# Patient Record
Sex: MALE | Race: WHITE | NOT HISPANIC OR LATINO | Employment: OTHER | URBAN - METROPOLITAN AREA
[De-identification: names, ages, dates, MRNs, and addresses within clinical notes are randomized per-mention and may not be internally consistent; named-entity substitution may affect disease eponyms.]

---

## 2017-09-25 ENCOUNTER — HOSPITAL ENCOUNTER (OUTPATIENT)
Facility: MEDICAL CENTER | Age: 77
End: 2017-09-25
Attending: INTERNAL MEDICINE
Payer: MEDICARE

## 2017-09-25 LAB
ALBUMIN SERPL BCP-MCNC: 2.8 G/DL (ref 3.2–4.9)
ALBUMIN/GLOB SERPL: 0.9 G/DL
ALP SERPL-CCNC: 133 U/L (ref 30–99)
ALT SERPL-CCNC: 15 U/L (ref 2–50)
ANION GAP SERPL CALC-SCNC: 19 MMOL/L (ref 0–11.9)
ANISOCYTOSIS BLD QL SMEAR: ABNORMAL
AST SERPL-CCNC: 14 U/L (ref 12–45)
BASOPHILS # BLD AUTO: 0 % (ref 0–1.8)
BASOPHILS # BLD: 0 K/UL (ref 0–0.12)
BILIRUB SERPL-MCNC: 0.4 MG/DL (ref 0.1–1.5)
BUN SERPL-MCNC: 102 MG/DL (ref 8–22)
CALCIUM SERPL-MCNC: 8.1 MG/DL (ref 8.5–10.5)
CHLORIDE SERPL-SCNC: 93 MMOL/L (ref 96–112)
CO2 SERPL-SCNC: 22 MMOL/L (ref 20–33)
CREAT SERPL-MCNC: 5.23 MG/DL (ref 0.5–1.4)
CRP SERPL HS-MCNC: 11.78 MG/DL (ref 0–0.75)
EOSINOPHIL # BLD AUTO: 0 K/UL (ref 0–0.51)
EOSINOPHIL NFR BLD: 0 % (ref 0–6.9)
ERYTHROCYTE [DISTWIDTH] IN BLOOD BY AUTOMATED COUNT: 87.6 FL (ref 35.9–50)
ERYTHROCYTE [SEDIMENTATION RATE] IN BLOOD BY WESTERGREN METHOD: 113 MM/HOUR (ref 0–20)
GFR SERPL CREATININE-BSD FRML MDRD: 11 ML/MIN/1.73 M 2
GLOBULIN SER CALC-MCNC: 3 G/DL (ref 1.9–3.5)
GLUCOSE SERPL-MCNC: 122 MG/DL (ref 65–99)
HCT VFR BLD AUTO: 36.2 % (ref 42–52)
HGB BLD-MCNC: 11.7 G/DL (ref 14–18)
LYMPHOCYTES # BLD AUTO: 0 K/UL (ref 1–4.8)
LYMPHOCYTES NFR BLD: 0 % (ref 22–41)
MACROCYTES BLD QL SMEAR: ABNORMAL
MANUAL DIFF BLD: NORMAL
MCH RBC QN AUTO: 34.2 PG (ref 27–33)
MCHC RBC AUTO-ENTMCNC: 32.3 G/DL (ref 33.7–35.3)
MCV RBC AUTO: 105.8 FL (ref 81.4–97.8)
METAMYELOCYTES NFR BLD MANUAL: 1.8 %
MONOCYTES # BLD AUTO: 0 K/UL (ref 0–0.85)
MONOCYTES NFR BLD AUTO: 0 % (ref 0–13.4)
MORPHOLOGY BLD-IMP: NORMAL
NEUTROPHILS # BLD AUTO: 3.04 K/UL (ref 1.82–7.42)
NEUTROPHILS NFR BLD: 95.5 % (ref 44–72)
NEUTS BAND NFR BLD MANUAL: 2.7 % (ref 0–10)
NRBC # BLD AUTO: 0 K/UL
NRBC BLD AUTO-RTO: 0 /100 WBC
PLATELET # BLD AUTO: 40 K/UL (ref 164–446)
PLATELET BLD QL SMEAR: NORMAL
PLATELETS.RETICULATED NFR BLD AUTO: 2.2 K/UL (ref 0.6–13.1)
PMV BLD AUTO: 11.1 FL (ref 9–12.9)
POIKILOCYTOSIS BLD QL SMEAR: NORMAL
POLYCHROMASIA BLD QL SMEAR: NORMAL
POTASSIUM SERPL-SCNC: 4.4 MMOL/L (ref 3.6–5.5)
PROT SERPL-MCNC: 5.8 G/DL (ref 6–8.2)
RBC # BLD AUTO: 3.42 M/UL (ref 4.7–6.1)
RBC BLD AUTO: PRESENT
SODIUM SERPL-SCNC: 134 MMOL/L (ref 135–145)
TOXIC GRANULES BLD QL SMEAR: SLIGHT
WBC # BLD AUTO: 3.1 K/UL (ref 4.8–10.8)

## 2017-09-25 PROCEDURE — 86140 C-REACTIVE PROTEIN: CPT

## 2017-09-25 PROCEDURE — 85027 COMPLETE CBC AUTOMATED: CPT

## 2017-09-25 PROCEDURE — 80299 QUANTITATIVE ASSAY DRUG: CPT

## 2017-09-25 PROCEDURE — 85007 BL SMEAR W/DIFF WBC COUNT: CPT

## 2017-09-25 PROCEDURE — 80053 COMPREHEN METABOLIC PANEL: CPT

## 2017-09-25 PROCEDURE — 85055 RETICULATED PLATELET ASSAY: CPT

## 2017-09-25 PROCEDURE — 87305 ASPERGILLUS AG IA: CPT

## 2017-09-25 PROCEDURE — 85652 RBC SED RATE AUTOMATED: CPT

## 2017-09-25 PROCEDURE — 82784 ASSAY IGA/IGD/IGG/IGM EACH: CPT

## 2017-09-27 LAB
GALACTOMANNAN AG SERPL QL IA: POSITIVE
GALACTOMANNAN AG SERPL-ACNC: 0.58

## 2017-09-28 LAB — VORICONAZOLE SERPL-MCNC: 0.3 UG/ML (ref 1–6)

## 2017-09-29 LAB
IGM SERPL-MCNC: 1160 MG/DL (ref 35–263)
VORICONAZOLE SERPL-MCNC: 1.8 UG/ML (ref 1–6)

## 2017-11-14 ENCOUNTER — HOSPITAL ENCOUNTER (INPATIENT)
Dept: HOSPITAL 8 - ED | Age: 77
LOS: 4 days | Discharge: HOME | DRG: 871 | End: 2017-11-18
Attending: FAMILY MEDICINE | Admitting: INTERNAL MEDICINE
Payer: MEDICARE

## 2017-11-14 VITALS — SYSTOLIC BLOOD PRESSURE: 144 MMHG | DIASTOLIC BLOOD PRESSURE: 74 MMHG

## 2017-11-14 VITALS — HEIGHT: 64 IN | WEIGHT: 177.03 LBS | BODY MASS INDEX: 30.22 KG/M2

## 2017-11-14 VITALS — SYSTOLIC BLOOD PRESSURE: 95 MMHG | DIASTOLIC BLOOD PRESSURE: 60 MMHG

## 2017-11-14 VITALS — DIASTOLIC BLOOD PRESSURE: 74 MMHG | SYSTOLIC BLOOD PRESSURE: 144 MMHG

## 2017-11-14 DIAGNOSIS — N25.0: ICD-10-CM

## 2017-11-14 DIAGNOSIS — E27.40: ICD-10-CM

## 2017-11-14 DIAGNOSIS — A41.9: Primary | ICD-10-CM

## 2017-11-14 DIAGNOSIS — E43: ICD-10-CM

## 2017-11-14 DIAGNOSIS — N18.6: ICD-10-CM

## 2017-11-14 DIAGNOSIS — D61.818: ICD-10-CM

## 2017-11-14 DIAGNOSIS — J96.01: ICD-10-CM

## 2017-11-14 DIAGNOSIS — Z79.01: ICD-10-CM

## 2017-11-14 DIAGNOSIS — Z82.3: ICD-10-CM

## 2017-11-14 DIAGNOSIS — I82.623: ICD-10-CM

## 2017-11-14 DIAGNOSIS — Z96.1: ICD-10-CM

## 2017-11-14 DIAGNOSIS — C85.90: ICD-10-CM

## 2017-11-14 DIAGNOSIS — J15.9: ICD-10-CM

## 2017-11-14 DIAGNOSIS — D63.1: ICD-10-CM

## 2017-11-14 DIAGNOSIS — Z87.891: ICD-10-CM

## 2017-11-14 DIAGNOSIS — Z99.2: ICD-10-CM

## 2017-11-14 DIAGNOSIS — E55.9: ICD-10-CM

## 2017-11-14 DIAGNOSIS — J43.9: ICD-10-CM

## 2017-11-14 DIAGNOSIS — D68.59: ICD-10-CM

## 2017-11-14 DIAGNOSIS — Z87.01: ICD-10-CM

## 2017-11-14 DIAGNOSIS — Z82.49: ICD-10-CM

## 2017-11-14 DIAGNOSIS — G93.40: ICD-10-CM

## 2017-11-14 DIAGNOSIS — N39.0: ICD-10-CM

## 2017-11-14 DIAGNOSIS — C88.0: ICD-10-CM

## 2017-11-14 LAB
ANISOCYTOSIS BLD QL SMEAR: (no result)
AST SERPL-CCNC: 10 U/L (ref 15–37)
BUN SERPL-MCNC: 41 MG/DL (ref 7–18)
DIFF TOTAL CELLS COUNTED: (no result)
HCT VFR BLD CALC: 28.2 % (ref 39.2–51.8)
HGB BLD-MCNC: 9.5 G/DL (ref 13.7–18)
IS PT STATUS REG ER OR PRE ER?: YES
PATH.CAST-FLAG: (no result)
SPERM-FLAG: (no result)
SRC-FLAG: (no result)
VERIFY COUNTS?: YES
WBC # BLD AUTO: 2.7 X10^3/UL (ref 3.4–10)
XTAL-FLAG: (no result)
YLC-FLAG: (no result)

## 2017-11-14 PROCEDURE — 36415 COLL VENOUS BLD VENIPUNCTURE: CPT

## 2017-11-14 PROCEDURE — 81001 URINALYSIS AUTO W/SCOPE: CPT

## 2017-11-14 PROCEDURE — 96368 THER/DIAG CONCURRENT INF: CPT

## 2017-11-14 PROCEDURE — 93005 ELECTROCARDIOGRAM TRACING: CPT

## 2017-11-14 PROCEDURE — 87070 CULTURE OTHR SPECIMN AEROBIC: CPT

## 2017-11-14 PROCEDURE — 96375 TX/PRO/DX INJ NEW DRUG ADDON: CPT

## 2017-11-14 PROCEDURE — 84100 ASSAY OF PHOSPHORUS: CPT

## 2017-11-14 PROCEDURE — 86606 ASPERGILLUS ANTIBODY: CPT

## 2017-11-14 PROCEDURE — 71010: CPT

## 2017-11-14 PROCEDURE — 87205 SMEAR GRAM STAIN: CPT

## 2017-11-14 PROCEDURE — 85025 COMPLETE CBC W/AUTO DIFF WBC: CPT

## 2017-11-14 PROCEDURE — 02HV33Z INSERTION OF INFUSION DEVICE INTO SUPERIOR VENA CAVA, PERCUTANEOUS APPROACH: ICD-10-PCS | Performed by: INTERNAL MEDICINE

## 2017-11-14 PROCEDURE — 36556 INSERT NON-TUNNEL CV CATH: CPT

## 2017-11-14 PROCEDURE — 86331 IMMUNODIFFUSION OUCHTERLONY: CPT

## 2017-11-14 PROCEDURE — 80202 ASSAY OF VANCOMYCIN: CPT

## 2017-11-14 PROCEDURE — 83605 ASSAY OF LACTIC ACID: CPT

## 2017-11-14 PROCEDURE — 84145 PROCALCITONIN (PCT): CPT

## 2017-11-14 PROCEDURE — 87305 ASPERGILLUS AG IA: CPT

## 2017-11-14 PROCEDURE — 71260 CT THORAX DX C+: CPT

## 2017-11-14 PROCEDURE — 86609 BACTERIUM ANTIBODY: CPT

## 2017-11-14 PROCEDURE — 85730 THROMBOPLASTIN TIME PARTIAL: CPT

## 2017-11-14 PROCEDURE — 87449 NOS EACH ORGANISM AG IA: CPT

## 2017-11-14 PROCEDURE — 80048 BASIC METABOLIC PNL TOTAL CA: CPT

## 2017-11-14 PROCEDURE — 86602 ANTINOMYCES ANTIBODY: CPT

## 2017-11-14 PROCEDURE — 86671 FUNGUS NES ANTIBODY: CPT

## 2017-11-14 PROCEDURE — 85610 PROTHROMBIN TIME: CPT

## 2017-11-14 PROCEDURE — 87040 BLOOD CULTURE FOR BACTERIA: CPT

## 2017-11-14 PROCEDURE — 83735 ASSAY OF MAGNESIUM: CPT

## 2017-11-14 PROCEDURE — 80053 COMPREHEN METABOLIC PANEL: CPT

## 2017-11-14 PROCEDURE — 84484 ASSAY OF TROPONIN QUANT: CPT

## 2017-11-14 PROCEDURE — 96365 THER/PROPH/DIAG IV INF INIT: CPT

## 2017-11-14 RX ADMIN — HYDROCORTISONE SODIUM SUCCINATE SCH MG: 100 INJECTION, POWDER, FOR SOLUTION INTRAMUSCULAR; INTRAVENOUS at 21:40

## 2017-11-14 RX ADMIN — ACETAMINOPHEN SCH MG: 500 TABLET, COATED ORAL at 15:53

## 2017-11-14 RX ADMIN — ACETAMINOPHEN SCH MG: 500 TABLET, COATED ORAL at 21:41

## 2017-11-14 RX ADMIN — FOLIC ACID SCH MG: 1 TABLET ORAL at 21:41

## 2017-11-14 RX ADMIN — SODIUM CHLORIDE SCH MLS/HR: 0.9 INJECTION, SOLUTION INTRAVENOUS at 15:53

## 2017-11-14 RX ADMIN — APIXABAN SCH MG: 2.5 TABLET, FILM COATED ORAL at 21:41

## 2017-11-14 RX ADMIN — ACETAMINOPHEN SCH MG: 500 TABLET, COATED ORAL at 21:00

## 2017-11-15 VITALS — SYSTOLIC BLOOD PRESSURE: 161 MMHG | DIASTOLIC BLOOD PRESSURE: 84 MMHG

## 2017-11-15 VITALS — DIASTOLIC BLOOD PRESSURE: 88 MMHG | SYSTOLIC BLOOD PRESSURE: 154 MMHG

## 2017-11-15 VITALS — SYSTOLIC BLOOD PRESSURE: 158 MMHG | DIASTOLIC BLOOD PRESSURE: 89 MMHG

## 2017-11-15 VITALS — SYSTOLIC BLOOD PRESSURE: 136 MMHG | DIASTOLIC BLOOD PRESSURE: 74 MMHG

## 2017-11-15 LAB
ANISOCYTOSIS BLD QL SMEAR: (no result)
AST SERPL-CCNC: 15 U/L (ref 15–37)
BUN SERPL-MCNC: 54 MG/DL (ref 7–18)
DIFF TOTAL CELLS COUNTED: (no result)
HCT VFR BLD CALC: 24.5 % (ref 39.2–51.8)
HGB BLD-MCNC: 8.4 G/DL (ref 13.7–18)
POLYCHROMASIA BLD QL SMEAR: (no result)
VERIFY COUNTS?: YES
WBC # BLD AUTO: 2.8 X10^3/UL (ref 3.4–10)

## 2017-11-15 PROCEDURE — 5A1D70Z PERFORMANCE OF URINARY FILTRATION, INTERMITTENT, LESS THAN 6 HOURS PER DAY: ICD-10-PCS | Performed by: INTERNAL MEDICINE

## 2017-11-15 RX ADMIN — ACETAMINOPHEN SCH MG: 500 TABLET, COATED ORAL at 11:00

## 2017-11-15 RX ADMIN — HYDROCORTISONE SODIUM SUCCINATE SCH MG: 100 INJECTION, POWDER, FOR SOLUTION INTRAMUSCULAR; INTRAVENOUS at 04:00

## 2017-11-15 RX ADMIN — FOLIC ACID SCH MG: 1 TABLET ORAL at 12:00

## 2017-11-15 RX ADMIN — OMEPRAZOLE SCH MG: 20 CAPSULE, DELAYED RELEASE ORAL at 11:59

## 2017-11-15 RX ADMIN — ACYCLOVIR SCH MG: 400 TABLET ORAL at 12:00

## 2017-11-15 RX ADMIN — HYDROCORTISONE SODIUM SUCCINATE SCH MG: 100 INJECTION, POWDER, FOR SOLUTION INTRAMUSCULAR; INTRAVENOUS at 20:14

## 2017-11-15 RX ADMIN — FOLIC ACID SCH MG: 1 TABLET ORAL at 21:00

## 2017-11-15 RX ADMIN — ACETAMINOPHEN SCH MG: 500 TABLET, COATED ORAL at 21:00

## 2017-11-15 RX ADMIN — APIXABAN SCH MG: 2.5 TABLET, FILM COATED ORAL at 20:15

## 2017-11-15 RX ADMIN — APIXABAN SCH MG: 2.5 TABLET, FILM COATED ORAL at 11:59

## 2017-11-15 RX ADMIN — ACETAMINOPHEN SCH MG: 500 TABLET, COATED ORAL at 16:00

## 2017-11-15 RX ADMIN — SODIUM CHLORIDE SCH MLS/HR: 0.9 INJECTION, SOLUTION INTRAVENOUS at 06:39

## 2017-11-15 RX ADMIN — ACETAMINOPHEN SCH MG: 500 TABLET, COATED ORAL at 05:09

## 2017-11-15 RX ADMIN — MEROPENEM SCH MLS/HR: 500 INJECTION, POWDER, FOR SOLUTION INTRAVENOUS at 01:39

## 2017-11-15 RX ADMIN — HYDROCORTISONE SODIUM SUCCINATE SCH MG: 100 INJECTION, POWDER, FOR SOLUTION INTRAMUSCULAR; INTRAVENOUS at 12:00

## 2017-11-15 RX ADMIN — Medication SCH MCG: at 11:59

## 2017-11-15 RX ADMIN — VITAMIN D, TAB 1000IU (100/BT) SCH UNITS: 25 TAB at 12:00

## 2017-11-16 VITALS — SYSTOLIC BLOOD PRESSURE: 146 MMHG | DIASTOLIC BLOOD PRESSURE: 73 MMHG

## 2017-11-16 VITALS — SYSTOLIC BLOOD PRESSURE: 172 MMHG | DIASTOLIC BLOOD PRESSURE: 81 MMHG

## 2017-11-16 VITALS — SYSTOLIC BLOOD PRESSURE: 171 MMHG | DIASTOLIC BLOOD PRESSURE: 81 MMHG

## 2017-11-16 VITALS — SYSTOLIC BLOOD PRESSURE: 107 MMHG | DIASTOLIC BLOOD PRESSURE: 67 MMHG

## 2017-11-16 LAB
AST SERPL-CCNC: 6 U/L (ref 15–37)
BUN SERPL-MCNC: 42 MG/DL (ref 7–18)
HCT VFR BLD CALC: 22.4 % (ref 39.2–51.8)
HGB BLD-MCNC: 7.7 G/DL (ref 13.7–18)
WBC # BLD AUTO: 2.5 X10^3/UL (ref 3.4–10)

## 2017-11-16 RX ADMIN — ACETAMINOPHEN SCH MG: 500 TABLET, COATED ORAL at 21:00

## 2017-11-16 RX ADMIN — FOLIC ACID SCH MG: 1 TABLET ORAL at 09:22

## 2017-11-16 RX ADMIN — SODIUM CHLORIDE SCH MLS/HR: 0.9 INJECTION, SOLUTION INTRAVENOUS at 18:41

## 2017-11-16 RX ADMIN — ACETAMINOPHEN SCH MG: 500 TABLET, COATED ORAL at 05:58

## 2017-11-16 RX ADMIN — OMEPRAZOLE SCH MG: 20 CAPSULE, DELAYED RELEASE ORAL at 09:22

## 2017-11-16 RX ADMIN — HYDROCORTISONE SODIUM SUCCINATE SCH MG: 100 INJECTION, POWDER, FOR SOLUTION INTRAMUSCULAR; INTRAVENOUS at 05:00

## 2017-11-16 RX ADMIN — SODIUM CHLORIDE SCH MLS/HR: 0.9 INJECTION, SOLUTION INTRAVENOUS at 05:00

## 2017-11-16 RX ADMIN — FOLIC ACID SCH MG: 1 TABLET ORAL at 23:03

## 2017-11-16 RX ADMIN — ACYCLOVIR SCH MG: 400 TABLET ORAL at 09:22

## 2017-11-16 RX ADMIN — ACETAMINOPHEN SCH MG: 500 TABLET, COATED ORAL at 11:59

## 2017-11-16 RX ADMIN — ACETAMINOPHEN SCH MG: 500 TABLET, COATED ORAL at 18:42

## 2017-11-16 RX ADMIN — APIXABAN SCH MG: 2.5 TABLET, FILM COATED ORAL at 23:02

## 2017-11-16 RX ADMIN — APIXABAN SCH MG: 2.5 TABLET, FILM COATED ORAL at 09:20

## 2017-11-16 RX ADMIN — MEROPENEM SCH MLS/HR: 500 INJECTION, POWDER, FOR SOLUTION INTRAVENOUS at 01:09

## 2017-11-16 RX ADMIN — Medication SCH MCG: at 09:22

## 2017-11-16 RX ADMIN — VITAMIN D, TAB 1000IU (100/BT) SCH UNITS: 25 TAB at 09:22

## 2017-11-17 VITALS — DIASTOLIC BLOOD PRESSURE: 76 MMHG | SYSTOLIC BLOOD PRESSURE: 146 MMHG

## 2017-11-17 VITALS — SYSTOLIC BLOOD PRESSURE: 124 MMHG | DIASTOLIC BLOOD PRESSURE: 69 MMHG

## 2017-11-17 VITALS — SYSTOLIC BLOOD PRESSURE: 147 MMHG | DIASTOLIC BLOOD PRESSURE: 84 MMHG

## 2017-11-17 VITALS — SYSTOLIC BLOOD PRESSURE: 152 MMHG | DIASTOLIC BLOOD PRESSURE: 69 MMHG

## 2017-11-17 VITALS — DIASTOLIC BLOOD PRESSURE: 88 MMHG | SYSTOLIC BLOOD PRESSURE: 159 MMHG

## 2017-11-17 LAB
BUN SERPL-MCNC: 59 MG/DL (ref 7–18)
HCT VFR BLD CALC: 22.9 % (ref 39.2–51.8)
HGB BLD-MCNC: 7.9 G/DL (ref 13.7–18)
WBC # BLD AUTO: 2 X10^3/UL (ref 3.4–10)

## 2017-11-17 PROCEDURE — 5A1D70Z PERFORMANCE OF URINARY FILTRATION, INTERMITTENT, LESS THAN 6 HOURS PER DAY: ICD-10-PCS | Performed by: INTERNAL MEDICINE

## 2017-11-17 RX ADMIN — ACETAMINOPHEN SCH MG: 500 TABLET, COATED ORAL at 22:10

## 2017-11-17 RX ADMIN — ACETAMINOPHEN SCH MG: 500 TABLET, COATED ORAL at 06:03

## 2017-11-17 RX ADMIN — ACYCLOVIR SCH MG: 400 TABLET ORAL at 13:37

## 2017-11-17 RX ADMIN — HYDROCORTISONE SODIUM SUCCINATE SCH MG: 100 INJECTION, POWDER, FOR SOLUTION INTRAMUSCULAR; INTRAVENOUS at 09:17

## 2017-11-17 RX ADMIN — ACETAMINOPHEN SCH MG: 500 TABLET, COATED ORAL at 15:36

## 2017-11-17 RX ADMIN — SODIUM CHLORIDE SCH MLS/HR: 0.9 INJECTION, SOLUTION INTRAVENOUS at 22:00

## 2017-11-17 RX ADMIN — ACETAMINOPHEN PRN MG: 325 TABLET, FILM COATED ORAL at 01:38

## 2017-11-17 RX ADMIN — FOLIC ACID SCH MG: 1 TABLET ORAL at 22:04

## 2017-11-17 RX ADMIN — APIXABAN SCH MG: 2.5 TABLET, FILM COATED ORAL at 22:04

## 2017-11-17 RX ADMIN — ACYCLOVIR SCH MG: 400 TABLET ORAL at 09:19

## 2017-11-17 RX ADMIN — SODIUM CHLORIDE SCH MLS/HR: 0.9 INJECTION, SOLUTION INTRAVENOUS at 06:03

## 2017-11-17 RX ADMIN — OMEPRAZOLE SCH MG: 20 CAPSULE, DELAYED RELEASE ORAL at 09:17

## 2017-11-17 RX ADMIN — Medication SCH MCG: at 09:17

## 2017-11-17 RX ADMIN — ACETAMINOPHEN SCH MG: 500 TABLET, COATED ORAL at 09:18

## 2017-11-17 RX ADMIN — VITAMIN D, TAB 1000IU (100/BT) SCH UNITS: 25 TAB at 09:17

## 2017-11-17 RX ADMIN — APIXABAN SCH MG: 2.5 TABLET, FILM COATED ORAL at 09:18

## 2017-11-17 RX ADMIN — FOLIC ACID SCH MG: 1 TABLET ORAL at 09:17

## 2017-11-18 VITALS — DIASTOLIC BLOOD PRESSURE: 80 MMHG | SYSTOLIC BLOOD PRESSURE: 174 MMHG

## 2017-11-18 VITALS — SYSTOLIC BLOOD PRESSURE: 128 MMHG | DIASTOLIC BLOOD PRESSURE: 70 MMHG

## 2017-11-18 VITALS — DIASTOLIC BLOOD PRESSURE: 75 MMHG | SYSTOLIC BLOOD PRESSURE: 128 MMHG

## 2017-11-18 RX ADMIN — ACETAMINOPHEN SCH MG: 500 TABLET, COATED ORAL at 06:00

## 2017-11-18 RX ADMIN — ACETAMINOPHEN PRN MG: 325 TABLET, FILM COATED ORAL at 07:42

## 2017-11-18 RX ADMIN — ACYCLOVIR SCH MG: 400 TABLET ORAL at 09:09

## 2017-11-18 RX ADMIN — HYDROCORTISONE SODIUM SUCCINATE SCH MG: 100 INJECTION, POWDER, FOR SOLUTION INTRAMUSCULAR; INTRAVENOUS at 09:09

## 2017-11-18 RX ADMIN — OXYCODONE HYDROCHLORIDE PRN MG: 5 TABLET ORAL at 02:56

## 2017-11-18 RX ADMIN — VITAMIN D, TAB 1000IU (100/BT) SCH UNITS: 25 TAB at 09:09

## 2017-11-18 RX ADMIN — ACETAMINOPHEN SCH MG: 500 TABLET, COATED ORAL at 10:34

## 2017-11-18 RX ADMIN — OMEPRAZOLE SCH MG: 20 CAPSULE, DELAYED RELEASE ORAL at 09:09

## 2017-11-18 RX ADMIN — OXYCODONE HYDROCHLORIDE PRN MG: 5 TABLET ORAL at 11:57

## 2017-11-18 RX ADMIN — APIXABAN SCH MG: 2.5 TABLET, FILM COATED ORAL at 09:15

## 2017-11-18 RX ADMIN — FOLIC ACID SCH MG: 1 TABLET ORAL at 09:09

## 2017-11-18 RX ADMIN — ACETAMINOPHEN SCH MG: 500 TABLET, COATED ORAL at 16:00

## 2017-11-18 RX ADMIN — SODIUM CHLORIDE SCH MLS/HR: 0.9 INJECTION, SOLUTION INTRAVENOUS at 11:00

## 2017-11-18 RX ADMIN — Medication SCH MCG: at 09:09

## 2017-11-21 LAB — FUNGITELL RESULT: 357 PG/ML (ref ?–80)

## 2017-11-29 ENCOUNTER — HOSPITAL ENCOUNTER (INPATIENT)
Dept: HOSPITAL 8 - ED | Age: 77
LOS: 1 days | Discharge: HOME | DRG: 682 | End: 2017-11-30
Attending: FAMILY MEDICINE | Admitting: FAMILY MEDICINE
Payer: MEDICARE

## 2017-11-29 VITALS — BODY MASS INDEX: 23.95 KG/M2 | HEIGHT: 71 IN | WEIGHT: 171.08 LBS

## 2017-11-29 VITALS — SYSTOLIC BLOOD PRESSURE: 146 MMHG | DIASTOLIC BLOOD PRESSURE: 64 MMHG

## 2017-11-29 VITALS — DIASTOLIC BLOOD PRESSURE: 112 MMHG | SYSTOLIC BLOOD PRESSURE: 170 MMHG

## 2017-11-29 DIAGNOSIS — G93.40: ICD-10-CM

## 2017-11-29 DIAGNOSIS — E83.52: ICD-10-CM

## 2017-11-29 DIAGNOSIS — D63.1: ICD-10-CM

## 2017-11-29 DIAGNOSIS — N25.81: ICD-10-CM

## 2017-11-29 DIAGNOSIS — Z79.01: ICD-10-CM

## 2017-11-29 DIAGNOSIS — I82.623: ICD-10-CM

## 2017-11-29 DIAGNOSIS — F41.9: ICD-10-CM

## 2017-11-29 DIAGNOSIS — J44.0: ICD-10-CM

## 2017-11-29 DIAGNOSIS — E43: ICD-10-CM

## 2017-11-29 DIAGNOSIS — J96.01: ICD-10-CM

## 2017-11-29 DIAGNOSIS — Z99.81: ICD-10-CM

## 2017-11-29 DIAGNOSIS — Z82.3: ICD-10-CM

## 2017-11-29 DIAGNOSIS — N25.0: ICD-10-CM

## 2017-11-29 DIAGNOSIS — Z87.01: ICD-10-CM

## 2017-11-29 DIAGNOSIS — Z82.49: ICD-10-CM

## 2017-11-29 DIAGNOSIS — D68.59: ICD-10-CM

## 2017-11-29 DIAGNOSIS — I12.0: Primary | ICD-10-CM

## 2017-11-29 DIAGNOSIS — E27.40: ICD-10-CM

## 2017-11-29 DIAGNOSIS — D61.818: ICD-10-CM

## 2017-11-29 DIAGNOSIS — C88.0: ICD-10-CM

## 2017-11-29 DIAGNOSIS — Z99.2: ICD-10-CM

## 2017-11-29 DIAGNOSIS — N18.6: ICD-10-CM

## 2017-11-29 DIAGNOSIS — Z87.891: ICD-10-CM

## 2017-11-29 LAB
ANISOCYTOSIS BLD QL SMEAR: (no result)
AST SERPL-CCNC: 13 U/L (ref 15–37)
BUN SERPL-MCNC: 63 MG/DL (ref 7–18)
DIFF TOTAL CELLS COUNTED: (no result)
HCT VFR BLD CALC: 23.7 % (ref 39.2–51.8)
HGB BLD-MCNC: 8.1 G/DL (ref 13.7–18)
OVALOCYTES BLD QL SMEAR: (no result)
PATH.CAST-FLAG: (no result)
POLYCHROMASIA BLD QL SMEAR: (no result)
SPERM-FLAG: (no result)
SRC-FLAG: (no result)
VERIFY COUNTS?: YES
WBC # BLD AUTO: 3.7 X10^3/UL (ref 3.4–10)
XTAL-FLAG: (no result)
YLC-FLAG: (no result)

## 2017-11-29 PROCEDURE — 87340 HEPATITIS B SURFACE AG IA: CPT

## 2017-11-29 PROCEDURE — 99285 EMERGENCY DEPT VISIT HI MDM: CPT

## 2017-11-29 PROCEDURE — 85610 PROTHROMBIN TIME: CPT

## 2017-11-29 PROCEDURE — 86704 HEP B CORE ANTIBODY TOTAL: CPT

## 2017-11-29 PROCEDURE — 5A1D70Z PERFORMANCE OF URINARY FILTRATION, INTERMITTENT, LESS THAN 6 HOURS PER DAY: ICD-10-PCS | Performed by: FAMILY MEDICINE

## 2017-11-29 PROCEDURE — 93005 ELECTROCARDIOGRAM TRACING: CPT

## 2017-11-29 PROCEDURE — 84145 PROCALCITONIN (PCT): CPT

## 2017-11-29 PROCEDURE — 85730 THROMBOPLASTIN TIME PARTIAL: CPT

## 2017-11-29 PROCEDURE — 85025 COMPLETE CBC W/AUTO DIFF WBC: CPT

## 2017-11-29 PROCEDURE — 86706 HEP B SURFACE ANTIBODY: CPT

## 2017-11-29 PROCEDURE — 83735 ASSAY OF MAGNESIUM: CPT

## 2017-11-29 PROCEDURE — 83605 ASSAY OF LACTIC ACID: CPT

## 2017-11-29 PROCEDURE — 80053 COMPREHEN METABOLIC PANEL: CPT

## 2017-11-29 PROCEDURE — 36415 COLL VENOUS BLD VENIPUNCTURE: CPT

## 2017-11-29 PROCEDURE — 71010: CPT

## 2017-11-29 PROCEDURE — 84100 ASSAY OF PHOSPHORUS: CPT

## 2017-11-29 PROCEDURE — 87040 BLOOD CULTURE FOR BACTERIA: CPT

## 2017-11-29 PROCEDURE — 82533 TOTAL CORTISOL: CPT

## 2017-11-29 PROCEDURE — 81001 URINALYSIS AUTO W/SCOPE: CPT

## 2017-11-29 RX ADMIN — APIXABAN SCH MG: 2.5 TABLET, FILM COATED ORAL at 22:32

## 2017-11-30 VITALS — DIASTOLIC BLOOD PRESSURE: 64 MMHG | SYSTOLIC BLOOD PRESSURE: 146 MMHG

## 2017-11-30 VITALS — DIASTOLIC BLOOD PRESSURE: 76 MMHG | SYSTOLIC BLOOD PRESSURE: 155 MMHG

## 2017-11-30 VITALS — DIASTOLIC BLOOD PRESSURE: 66 MMHG | SYSTOLIC BLOOD PRESSURE: 144 MMHG

## 2017-11-30 VITALS — DIASTOLIC BLOOD PRESSURE: 71 MMHG | SYSTOLIC BLOOD PRESSURE: 159 MMHG

## 2017-11-30 LAB
ANISOCYTOSIS BLD QL SMEAR: (no result)
AST SERPL-CCNC: 15 U/L (ref 15–37)
BUN SERPL-MCNC: 29 MG/DL (ref 7–18)
DIFF TOTAL CELLS COUNTED: (no result)
HCT VFR BLD CALC: 23.7 % (ref 39.2–51.8)
HEP B SURF. AB: 25.1 MIU/ML (ref 0–10)
HGB BLD-MCNC: 8.1 G/DL (ref 13.7–18)
OVALOCYTES BLD QL SMEAR: (no result)
POLYCHROMASIA BLD QL SMEAR: (no result)
VERIFY COUNTS?: YES
WBC # BLD AUTO: 2.8 X10^3/UL (ref 3.4–10)

## 2017-11-30 PROCEDURE — 5A1D70Z PERFORMANCE OF URINARY FILTRATION, INTERMITTENT, LESS THAN 6 HOURS PER DAY: ICD-10-PCS | Performed by: FAMILY MEDICINE

## 2017-11-30 RX ADMIN — APIXABAN SCH MG: 2.5 TABLET, FILM COATED ORAL at 10:50

## 2017-12-22 ENCOUNTER — HOSPITAL ENCOUNTER (OUTPATIENT)
Facility: MEDICAL CENTER | Age: 77
End: 2017-12-22
Attending: OPHTHALMOLOGY | Admitting: OPHTHALMOLOGY
Payer: MEDICARE

## 2017-12-22 VITALS
HEIGHT: 71 IN | WEIGHT: 165 LBS | BODY MASS INDEX: 23.1 KG/M2 | HEART RATE: 80 BPM | DIASTOLIC BLOOD PRESSURE: 57 MMHG | OXYGEN SATURATION: 97 % | TEMPERATURE: 98.2 F | RESPIRATION RATE: 18 BRPM | SYSTOLIC BLOOD PRESSURE: 111 MMHG

## 2017-12-22 LAB
ALBUMIN SERPL BCP-MCNC: 3.5 G/DL (ref 3.2–4.9)
ALBUMIN/GLOB SERPL: 1.5 G/DL
ALP SERPL-CCNC: 58 U/L (ref 30–99)
ALT SERPL-CCNC: 8 U/L (ref 2–50)
ANION GAP SERPL CALC-SCNC: 12 MMOL/L (ref 0–11.9)
ANISOCYTOSIS BLD QL SMEAR: ABNORMAL
APTT PPP: 21.8 SEC (ref 24.7–36)
AST SERPL-CCNC: 12 U/L (ref 12–45)
BASOPHILS # BLD AUTO: 0 % (ref 0–1.8)
BASOPHILS # BLD: 0 K/UL (ref 0–0.12)
BILIRUB SERPL-MCNC: 0.4 MG/DL (ref 0.1–1.5)
BUN SERPL-MCNC: 41 MG/DL (ref 8–22)
CALCIUM SERPL-MCNC: 9.5 MG/DL (ref 8.5–10.5)
CHLORIDE SERPL-SCNC: 97 MMOL/L (ref 96–112)
CO2 SERPL-SCNC: 29 MMOL/L (ref 20–33)
CREAT SERPL-MCNC: 5.16 MG/DL (ref 0.5–1.4)
EKG IMPRESSION: NORMAL
EOSINOPHIL # BLD AUTO: 0 K/UL (ref 0–0.51)
EOSINOPHIL NFR BLD: 0 % (ref 0–6.9)
ERYTHROCYTE [DISTWIDTH] IN BLOOD BY AUTOMATED COUNT: 86 FL (ref 35.9–50)
GFR SERPL CREATININE-BSD FRML MDRD: 11 ML/MIN/1.73 M 2
GLOBULIN SER CALC-MCNC: 2.4 G/DL (ref 1.9–3.5)
GLUCOSE SERPL-MCNC: 103 MG/DL (ref 65–99)
GRAM STN SPEC: NORMAL
HCT VFR BLD AUTO: 27.5 % (ref 42–52)
HGB BLD-MCNC: 8.8 G/DL (ref 14–18)
INR PPP: 1.3 (ref 0.87–1.13)
LYMPHOCYTES # BLD AUTO: 0.1 K/UL (ref 1–4.8)
LYMPHOCYTES NFR BLD: 2 % (ref 22–41)
MACROCYTES BLD QL SMEAR: ABNORMAL
MANUAL DIFF BLD: NORMAL
MCH RBC QN AUTO: 35.6 PG (ref 27–33)
MCHC RBC AUTO-ENTMCNC: 32 G/DL (ref 33.7–35.3)
MCV RBC AUTO: 111.3 FL (ref 81.4–97.8)
METAMYELOCYTES NFR BLD MANUAL: 2 %
MONOCYTES # BLD AUTO: 0.14 K/UL (ref 0–0.85)
MONOCYTES NFR BLD AUTO: 3 % (ref 0–13.4)
MORPHOLOGY BLD-IMP: NORMAL
MYELOCYTES NFR BLD MANUAL: 1 %
NEUTROPHILS # BLD AUTO: 4.42 K/UL (ref 1.82–7.42)
NEUTROPHILS NFR BLD: 89 % (ref 44–72)
NEUTS BAND NFR BLD MANUAL: 3 % (ref 0–10)
NRBC # BLD AUTO: 0 K/UL
NRBC BLD-RTO: 0 /100 WBC
OVALOCYTES BLD QL SMEAR: NORMAL
PLATELET # BLD AUTO: 68 K/UL (ref 164–446)
PLATELET BLD QL SMEAR: NORMAL
PMV BLD AUTO: 9.8 FL (ref 9–12.9)
POTASSIUM SERPL-SCNC: 3.9 MMOL/L (ref 3.6–5.5)
PROT SERPL-MCNC: 5.9 G/DL (ref 6–8.2)
PROTHROMBIN TIME: 15.9 SEC (ref 12–14.6)
RBC # BLD AUTO: 2.47 M/UL (ref 4.7–6.1)
RBC BLD AUTO: PRESENT
SIGNIFICANT IND 70042: NORMAL
SITE SITE: NORMAL
SODIUM SERPL-SCNC: 138 MMOL/L (ref 135–145)
SOURCE SOURCE: NORMAL
TOXIC GRANULES BLD QL SMEAR: SLIGHT
WBC # BLD AUTO: 4.8 K/UL (ref 4.8–10.8)

## 2017-12-22 PROCEDURE — 160035 HCHG PACU - 1ST 60 MINS PHASE I: Performed by: OPHTHALMOLOGY

## 2017-12-22 PROCEDURE — 700101 HCHG RX REV CODE 250

## 2017-12-22 PROCEDURE — 85027 COMPLETE CBC AUTOMATED: CPT

## 2017-12-22 PROCEDURE — 93010 ELECTROCARDIOGRAM REPORT: CPT | Performed by: INTERNAL MEDICINE

## 2017-12-22 PROCEDURE — 160009 HCHG ANES TIME/MIN: Performed by: OPHTHALMOLOGY

## 2017-12-22 PROCEDURE — 85007 BL SMEAR W/DIFF WBC COUNT: CPT

## 2017-12-22 PROCEDURE — 160036 HCHG PACU - EA ADDL 30 MINS PHASE I: Performed by: OPHTHALMOLOGY

## 2017-12-22 PROCEDURE — 80053 COMPREHEN METABOLIC PANEL: CPT

## 2017-12-22 PROCEDURE — 501836 HCHG SUTURE EYE: Performed by: OPHTHALMOLOGY

## 2017-12-22 PROCEDURE — 700111 HCHG RX REV CODE 636 W/ 250 OVERRIDE (IP)

## 2017-12-22 PROCEDURE — 160029 HCHG SURGERY MINUTES - 1ST 30 MINS LEVEL 4: Performed by: OPHTHALMOLOGY

## 2017-12-22 PROCEDURE — 700111 HCHG RX REV CODE 636 W/ 250 OVERRIDE (IP): Performed by: OPHTHALMOLOGY

## 2017-12-22 PROCEDURE — 93005 ELECTROCARDIOGRAM TRACING: CPT | Performed by: OPHTHALMOLOGY

## 2017-12-22 PROCEDURE — 160002 HCHG RECOVERY MINUTES (STAT): Performed by: OPHTHALMOLOGY

## 2017-12-22 PROCEDURE — A6402 STERILE GAUZE <= 16 SQ IN: HCPCS | Performed by: OPHTHALMOLOGY

## 2017-12-22 PROCEDURE — 87075 CULTR BACTERIA EXCEPT BLOOD: CPT

## 2017-12-22 PROCEDURE — 85730 THROMBOPLASTIN TIME PARTIAL: CPT

## 2017-12-22 PROCEDURE — 87205 SMEAR GRAM STAIN: CPT

## 2017-12-22 PROCEDURE — 160041 HCHG SURGERY MINUTES - EA ADDL 1 MIN LEVEL 4: Performed by: OPHTHALMOLOGY

## 2017-12-22 PROCEDURE — 500558 HCHG EYE SHIELD W/GARTER (FOX): Performed by: OPHTHALMOLOGY

## 2017-12-22 PROCEDURE — A6410 STERILE EYE PAD: HCPCS | Performed by: OPHTHALMOLOGY

## 2017-12-22 PROCEDURE — 85610 PROTHROMBIN TIME: CPT

## 2017-12-22 PROCEDURE — 87102 FUNGUS ISOLATION CULTURE: CPT

## 2017-12-22 PROCEDURE — 160048 HCHG OR STATISTICAL LEVEL 1-5: Performed by: OPHTHALMOLOGY

## 2017-12-22 PROCEDURE — 700101 HCHG RX REV CODE 250: Performed by: OPHTHALMOLOGY

## 2017-12-22 PROCEDURE — 87070 CULTURE OTHR SPECIMN AEROBIC: CPT

## 2017-12-22 RX ORDER — FOLIC ACID 1 MG/1
TABLET ORAL DAILY
COMMUNITY
End: 2018-01-09

## 2017-12-22 RX ORDER — GLUCOSAMINE HCL 500 MG
1000 TABLET ORAL
COMMUNITY
End: 2018-01-09

## 2017-12-22 RX ORDER — BALANCED SALT SOLUTION 6.4; .75; .48; .3; 3.9; 1.7 MG/ML; MG/ML; MG/ML; MG/ML; MG/ML; MG/ML
SOLUTION OPHTHALMIC
Status: DISCONTINUED | OUTPATIENT
Start: 2017-12-22 | End: 2017-12-22 | Stop reason: HOSPADM

## 2017-12-22 RX ORDER — BALANCED SALT SOLUTION ENRICHED WITH BICARBONATE, DEXTROSE, AND GLUTATHIONE
KIT INTRAOCULAR
Status: DISCONTINUED | OUTPATIENT
Start: 2017-12-22 | End: 2017-12-22 | Stop reason: HOSPADM

## 2017-12-22 RX ORDER — VORICONAZOLE 200 MG/1
200 TABLET, FILM COATED ORAL 2 TIMES DAILY
COMMUNITY
End: 2018-01-09

## 2017-12-22 RX ORDER — LORATADINE 10 MG/1
10 TABLET ORAL DAILY
COMMUNITY

## 2017-12-22 RX ORDER — DEXAMETHASONE SODIUM PHOSPHATE 4 MG/ML
INJECTION, SOLUTION INTRA-ARTICULAR; INTRALESIONAL; INTRAMUSCULAR; INTRAVENOUS; SOFT TISSUE
Status: DISCONTINUED | OUTPATIENT
Start: 2017-12-22 | End: 2017-12-22 | Stop reason: HOSPADM

## 2017-12-22 RX ORDER — HYDROCORTISONE 20 MG/1
5 TABLET ORAL DAILY
COMMUNITY
End: 2018-03-21

## 2017-12-22 RX ORDER — CEFAZOLIN SODIUM 1 G/3ML
INJECTION, POWDER, FOR SOLUTION INTRAMUSCULAR; INTRAVENOUS
Status: DISCONTINUED | OUTPATIENT
Start: 2017-12-22 | End: 2017-12-22 | Stop reason: HOSPADM

## 2017-12-22 RX ORDER — BALANCED SALT SOLUTION ENRICHED WITH BICARBONATE, DEXTROSE, AND GLUTATHIONE
KIT INTRAOCULAR
Status: DISCONTINUED
Start: 2017-12-22 | End: 2017-12-22 | Stop reason: HOSPADM

## 2017-12-22 RX ORDER — AMOXICILLIN 250 MG
1 CAPSULE ORAL DAILY
COMMUNITY
End: 2018-01-09

## 2017-12-22 RX ORDER — OMEPRAZOLE 20 MG/1
20 CAPSULE, DELAYED RELEASE ORAL DAILY
COMMUNITY
End: 2018-03-13 | Stop reason: SDUPTHER

## 2017-12-22 RX ORDER — PHENYLEPHRINE HYDROCHLORIDE 25 MG/ML
SOLUTION/ DROPS OPHTHALMIC
Status: COMPLETED
Start: 2017-12-22 | End: 2017-12-22

## 2017-12-22 RX ORDER — CYCLOPENTOLATE HYDROCHLORIDE 10 MG/ML
SOLUTION/ DROPS OPHTHALMIC
Status: COMPLETED
Start: 2017-12-22 | End: 2017-12-22

## 2017-12-22 RX ORDER — OXYCODONE HYDROCHLORIDE AND ACETAMINOPHEN 5; 325 MG/1; MG/1
1-2 TABLET ORAL EVERY 4 HOURS PRN
COMMUNITY
End: 2018-01-09

## 2017-12-22 RX ORDER — DOCUSATE SODIUM 100 MG/1
100 CAPSULE, LIQUID FILLED ORAL 2 TIMES DAILY
COMMUNITY
End: 2018-01-09

## 2017-12-22 RX ORDER — SEVELAMER CARBONATE FOR ORAL SUSPENSION 800 MG/1
800 POWDER, FOR SUSPENSION ORAL 3 TIMES DAILY
COMMUNITY

## 2017-12-22 RX ORDER — ACETAMINOPHEN 500 MG
500-1000 TABLET ORAL EVERY 6 HOURS PRN
COMMUNITY
End: 2018-01-09

## 2017-12-22 RX ORDER — CHOLECALCIFEROL (VITAMIN D3) 125 MCG
1000 CAPSULE ORAL DAILY
COMMUNITY
End: 2018-01-09

## 2017-12-22 RX ADMIN — VANCOMYCIN HYDROCHLORIDE 1 MG: 500 INJECTION, POWDER, LYOPHILIZED, FOR SOLUTION INTRAVENOUS at 13:00

## 2017-12-22 RX ADMIN — VORICONAZOLE: 10 INJECTION, POWDER, LYOPHILIZED, FOR SOLUTION INTRAVENOUS at 13:00

## 2017-12-22 RX ADMIN — PHENYLEPHRINE HYDROCHLORIDE 1 DROP: 2.5 SOLUTION/ DROPS OPHTHALMIC at 12:30

## 2017-12-22 RX ADMIN — CYCLOPENTOLATE HYDROCHLORIDE 1 DROP: 10 SOLUTION/ DROPS OPHTHALMIC at 12:30

## 2017-12-22 ASSESSMENT — PAIN SCALES - GENERAL
PAINLEVEL_OUTOF10: 2
PAINLEVEL_OUTOF10: 5
PAINLEVEL_OUTOF10: 2
PAINLEVEL_OUTOF10: 0
PAINLEVEL_OUTOF10: 2

## 2017-12-22 NOTE — DISCHARGE INSTRUCTIONS
RETINAL DETACHMENT OR VITRECTOMY INSTRUCTIONS    These instructions are provided so that you can have the best chance for a successful recovery from your recent eye surgery. In general, they will remain in effect for at least two to three weeks following your operation.   Please call my office to see me within one week following your discharge from the hospital. You can make this appointment by calling my office. Call Monday through Friday from 8:00 am to 5:00 pm. For patients who live a great distance from my office, I may ask you to make arrangements with your local ophthalmologist for follow up care instead of returning here.     Medications:     Administer eye drops as scheduled by physician    Eye Care:    KEEP EYE PATCH IN PLACE AT ALL TIMES EXCEPT WHEN ADMINISTERING EYE DROPS.    Appearance and sensation in the operated eye:    1.   It is normal for the operated eye to remain somewhat red, swollen and slightly irritated for four to six weeks.     2.   It is expected that there will be an increased amount of tearing and mattering in the operated eye.     Return of eyesight:     1.   You final best vision will not be known until the eye and retina are completely healed, which takes at least two to three months and sometimes much longer.     2.   Following this type of surgery, you may require a change in the prescription for your glasses or contact lenses. In general, checking for a new prescription is not done until at least two to three months following your surgery.     Physical Activities:    Things to Avoid:    1.   Aspirin  2.   Lifting or moving heavy objects (20 pounds or more)  3.   Rubbing the operated eye.   4.   Strenuous or jarring physical exertion such as running, jumping, aerobics and swimming.   5.   Driving a car.  6.   Garden or yard work.  7.   Wearing of contact lenses in the operated eye for 4-6 weeks.  8.   Returning to work or school; depending upon the nature of eye surgery and  occupation, I will advise you to refrain from returning to school or work for anywhere from 2-8 weeks.   9.   Air travel unless otherwise instructed.   10. Reading unless otherwise instructed.     Permissible Activities:    1.   Watching television and using your eyes in moderation.   2.   Cooking and light housework.   3.   Riding in a car on paved roads.  4.   Showering, bathing and shaving; however, avoid getting water in your eyes.     Indications for calling my office:     1.   Increased swelling or redness of the eyelids.  2.   Increased persistent pain in the eye which is not relieved by Tylenol or which does not go away within 24 hours.   3.   Decreased vision.

## 2017-12-22 NOTE — OR NURSING
1436 Pt arrived from OR with Dr. Pete. Pt VSS, PIV infusing without issue.  Eye patch in place to right eye, CDI.   1500 Pt denies any pain/nausea, tolerating coffee and crackers well.    1510 Pt daughter brought to bedside.    1530 Pt up to change clothes at bedside. Tolerated well. Moved to recliner.    1545 Pt feels ready to go home, PIV removed, pt tolerated well. Meets d/c criteria.   1606 Discharge instructions with patient and family. Answered all questions and concerns.   1610 PIV removed, pt tolerated well.  Pt meets d/c criteria, feels ready to go home.   1620 Pt left via wheelchair to d/c home.
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
## 2017-12-23 NOTE — OP REPORT
DATE OF SERVICE:  12/22/2017    PREOPERATIVE DIAGNOSIS:  Endophthalmitis, presumably of an infectious   etiology, right eye.    POSTOPERATIVE DIAGNOSIS:  Endophthalmitis, presumably of an infectious   etiology, right eye.    SURGEON:  Antwan Resendez MD    ASSISTANT:  None.    ANESTHESIA:  Local.    ANESTHESIOLOGIST:  Chato Pete DO    BLOOD LOSS:  None.    SPECIMENS REMOVED:  Pure vitreous, which was sent for microbiological   analysis.    PROCEDURES PERFORMED:  Pars plana vitrectomy, injection of intravitreal   antibiotics, and intravitreal antifungals.    PROCEDURE IN DETAIL:  Patient prepped and draped in the usual sterile manner.    Attention was directed towards the right eye after administration of local   anesthesia.  A 23 gauge trocars were placed 3.5 mm posterior to the limbus at   8:30, 9:30, and 2:30 o'clock.  The infusion cannula was placed in the   inferotemporal sclerotomy and secured once it was verified to be correctly   positioned.  Central vitrectomy was performed with manual aspiration, giving   us approximately 0.5 mL of pure vitreous, which was then sent for   microbiological analysis.  The infusion was then turned on and the vitrectomy   was completed.  A 2 mg of ceftazidime and 1 mg of vancomycin and 100 mcg of   voriconazole were injected through 30-gauge needles into the eye.  The trocars   were removed.  There was no leak seen.  The pressure was approximately 20 by   digital estimation.  Subconjunctival Kefzol and Decadron were injected.    Patient tolerated the procedure well and taken to recovery room in good and   stable condition.       ____________________________________     MD SERENA REYNOSO / NTS    DD:  12/22/2017 15:05:19  DT:  12/22/2017 15:20:00    D#:  [id number]  Job#:  [id number]

## 2017-12-25 LAB
BACTERIA WND AEROBE CULT: NORMAL
GRAM STN SPEC: NORMAL
SIGNIFICANT IND 70042: NORMAL
SITE SITE: NORMAL
SOURCE SOURCE: NORMAL

## 2017-12-27 LAB
BACTERIA SPEC ANAEROBE CULT: NORMAL
SIGNIFICANT IND 70042: NORMAL
SITE SITE: NORMAL
SOURCE SOURCE: NORMAL

## 2018-01-07 PROBLEM — Z99.2 END STAGE RENAL DISEASE ON DIALYSIS (HCC): Status: ACTIVE | Noted: 2018-01-07

## 2018-01-07 PROBLEM — E27.40 ADRENAL INSUFFICIENCY (HCC): Chronic | Status: ACTIVE | Noted: 2018-01-07

## 2018-01-07 PROBLEM — N18.6 END STAGE RENAL DISEASE ON DIALYSIS (HCC): Status: ACTIVE | Noted: 2018-01-07

## 2018-01-07 PROBLEM — Z87.891 HISTORY OF TOBACCO ABUSE: Status: ACTIVE | Noted: 2018-01-07

## 2018-01-07 PROBLEM — C88.0 WALDENSTROM MACROGLOBULINEMIA (HCC): Chronic | Status: ACTIVE | Noted: 2018-01-07

## 2018-01-07 PROBLEM — D53.9 MACROCYTIC ANEMIA: Chronic | Status: ACTIVE | Noted: 2018-01-07

## 2018-01-07 PROBLEM — N18.6 END STAGE RENAL DISEASE ON DIALYSIS (HCC): Chronic | Status: ACTIVE | Noted: 2018-01-07

## 2018-01-07 PROBLEM — Z86.19: Status: ACTIVE | Noted: 2018-01-07

## 2018-01-07 PROBLEM — Z79.52 STEROID DEPENDENT FOR ADRENAL SUPRESSION: Chronic | Status: ACTIVE | Noted: 2018-01-07

## 2018-01-07 PROBLEM — J44.9 COPD (CHRONIC OBSTRUCTIVE PULMONARY DISEASE) (HCC): Chronic | Status: ACTIVE | Noted: 2018-01-07

## 2018-01-07 PROBLEM — N25.0 RENAL OSTEODYSTROPHY: Chronic | Status: ACTIVE | Noted: 2018-01-07

## 2018-01-07 PROBLEM — Z99.81 OXYGEN DEPENDENT: Status: ACTIVE | Noted: 2018-01-07

## 2018-01-07 PROBLEM — J44.89 CHRONIC BRONCHITIS WITH COPD (CHRONIC OBSTRUCTIVE PULMONARY DISEASE) (HCC): Chronic | Status: ACTIVE | Noted: 2018-01-07

## 2018-01-07 PROBLEM — J18.9 RECURRENT PNEUMONIA: Status: ACTIVE | Noted: 2018-01-07

## 2018-01-07 PROBLEM — Z99.2 END STAGE RENAL DISEASE ON DIALYSIS (HCC): Chronic | Status: ACTIVE | Noted: 2018-01-07

## 2018-01-07 PROBLEM — D61.818 PANCYTOPENIA (HCC): Chronic | Status: ACTIVE | Noted: 2018-01-07

## 2018-01-07 PROBLEM — Z86.718 HISTORY OF DVT (DEEP VEIN THROMBOSIS): Status: ACTIVE | Noted: 2018-01-07

## 2018-01-09 ENCOUNTER — OFFICE VISIT (OUTPATIENT)
Dept: INTERNAL MEDICINE | Facility: IMAGING CENTER | Age: 78
End: 2018-01-09
Payer: MEDICARE

## 2018-01-09 ENCOUNTER — HOSPITAL ENCOUNTER (OUTPATIENT)
Dept: RADIOLOGY | Facility: MEDICAL CENTER | Age: 78
End: 2018-01-09
Attending: FAMILY MEDICINE
Payer: MEDICARE

## 2018-01-09 VITALS
TEMPERATURE: 97.9 F | HEIGHT: 71 IN | RESPIRATION RATE: 14 BRPM | HEART RATE: 100 BPM | OXYGEN SATURATION: 99 % | BODY MASS INDEX: 22.96 KG/M2 | DIASTOLIC BLOOD PRESSURE: 80 MMHG | WEIGHT: 164 LBS | SYSTOLIC BLOOD PRESSURE: 170 MMHG

## 2018-01-09 DIAGNOSIS — R53.83 FATIGUE, UNSPECIFIED TYPE: ICD-10-CM

## 2018-01-09 DIAGNOSIS — Z87.891 HISTORY OF TOBACCO ABUSE: ICD-10-CM

## 2018-01-09 DIAGNOSIS — D68.69 SECONDARY HYPERCOAGULABLE STATE (HCC): Chronic | ICD-10-CM

## 2018-01-09 DIAGNOSIS — Z99.2 END STAGE RENAL DISEASE ON DIALYSIS (HCC): ICD-10-CM

## 2018-01-09 DIAGNOSIS — C88.0 WALDENSTROM MACROGLOBULINEMIA (HCC): Chronic | ICD-10-CM

## 2018-01-09 DIAGNOSIS — J18.9 RECURRENT PNEUMONIA: ICD-10-CM

## 2018-01-09 DIAGNOSIS — J44.89 CHRONIC BRONCHITIS WITH COPD (CHRONIC OBSTRUCTIVE PULMONARY DISEASE): Chronic | ICD-10-CM

## 2018-01-09 DIAGNOSIS — M54.42 ACUTE BILATERAL LOW BACK PAIN WITH LEFT-SIDED SCIATICA: ICD-10-CM

## 2018-01-09 DIAGNOSIS — Z79.01 CHRONIC ANTICOAGULATION: Chronic | ICD-10-CM

## 2018-01-09 DIAGNOSIS — M54.6 THORACIC SPINE PAIN: ICD-10-CM

## 2018-01-09 DIAGNOSIS — Z99.81 OXYGEN DEPENDENT: ICD-10-CM

## 2018-01-09 DIAGNOSIS — N25.0 RENAL OSTEODYSTROPHY: Chronic | ICD-10-CM

## 2018-01-09 DIAGNOSIS — E27.40 ADRENAL INSUFFICIENCY (HCC): Chronic | ICD-10-CM

## 2018-01-09 DIAGNOSIS — D61.818 PANCYTOPENIA (HCC): Chronic | ICD-10-CM

## 2018-01-09 DIAGNOSIS — Z12.5 SCREENING FOR PROSTATE CANCER: ICD-10-CM

## 2018-01-09 DIAGNOSIS — Z00.00 HEALTH CARE MAINTENANCE: ICD-10-CM

## 2018-01-09 DIAGNOSIS — Z86.19 H/O ASPERGILLOSIS: ICD-10-CM

## 2018-01-09 DIAGNOSIS — Z86.718 HISTORY OF DVT (DEEP VEIN THROMBOSIS): ICD-10-CM

## 2018-01-09 DIAGNOSIS — D53.9 MACROCYTIC ANEMIA: Chronic | ICD-10-CM

## 2018-01-09 DIAGNOSIS — N18.6 END STAGE RENAL DISEASE ON DIALYSIS (HCC): ICD-10-CM

## 2018-01-09 DIAGNOSIS — H44.111 PANUVEITIS OF RIGHT EYE: ICD-10-CM

## 2018-01-09 DIAGNOSIS — S41.111A SKIN TEAR OF RIGHT UPPER ARM WITHOUT COMPLICATION, INITIAL ENCOUNTER: ICD-10-CM

## 2018-01-09 DIAGNOSIS — R79.89 ABNORMAL CORTISOL LEVEL: ICD-10-CM

## 2018-01-09 LAB
APPEARANCE UR: CLEAR
BILIRUB UR STRIP-MCNC: NEGATIVE MG/DL
COLOR UR AUTO: YELLOW
GLUCOSE UR STRIP.AUTO-MCNC: NEGATIVE MG/DL
KETONES UR STRIP.AUTO-MCNC: NEGATIVE MG/DL
LEUKOCYTE ESTERASE UR QL STRIP.AUTO: NEGATIVE
NITRITE UR QL STRIP.AUTO: NEGATIVE
PH UR STRIP.AUTO: 8 [PH] (ref 5–8)
PROT UR QL STRIP: 30 MG/DL
RBC UR QL AUTO: NEGATIVE
SP GR UR STRIP.AUTO: 1.01
UROBILINOGEN UR STRIP-MCNC: NEGATIVE MG/DL

## 2018-01-09 PROCEDURE — 72070 X-RAY EXAM THORAC SPINE 2VWS: CPT

## 2018-01-09 PROCEDURE — 81002 URINALYSIS NONAUTO W/O SCOPE: CPT | Performed by: FAMILY MEDICINE

## 2018-01-09 PROCEDURE — 99205 OFFICE O/P NEW HI 60 MIN: CPT | Mod: 25 | Performed by: FAMILY MEDICINE

## 2018-01-09 PROCEDURE — 72100 X-RAY EXAM L-S SPINE 2/3 VWS: CPT

## 2018-01-09 RX ORDER — DIFLUPREDNATE OPHTHALMIC 0.5 MG/ML
1 EMULSION OPHTHALMIC 4 TIMES DAILY
COMMUNITY
End: 2018-03-21

## 2018-01-09 RX ORDER — TRAMADOL HYDROCHLORIDE 50 MG/1
50 TABLET ORAL EVERY 6 HOURS PRN
Qty: 56 TAB | Refills: 0 | Status: SHIPPED | OUTPATIENT
Start: 2018-01-09 | End: 2018-01-23

## 2018-01-09 RX ORDER — LANOLIN ALCOHOL/MO/W.PET/CERES
1000 CREAM (GRAM) TOPICAL DAILY
COMMUNITY

## 2018-01-09 RX ORDER — LANOLIN ALCOHOL/MO/W.PET/CERES
800 CREAM (GRAM) TOPICAL DAILY
COMMUNITY
End: 2018-03-20

## 2018-01-09 RX ORDER — ACETAMINOPHEN 500 MG
1000 TABLET ORAL 2 TIMES DAILY
COMMUNITY

## 2018-01-09 NOTE — LETTER
January 9, 2018     Juanito Handy Blaire  [address] [place] [address]      Dear Juanito:    Thank you for enrolling in [facility]. Please follow the instructions below to securely access your online medical record. [facility] allows you to send messages to your healthcare team, view certain test results, renew your prescriptions, schedule appointments, and more.     How Do I Sign Up?  1. In your Internet browser, go to  [email or web address]  2. Click on the Sign Up Now link in the Sign In box. You will see the New Member Sign Up page.  3. Enter your [facility] Access Code exactly as it appears below (case sensitive). You will not need to use this code after you’ve completed the sign-up process. If you do not sign up before the expiration date, you must request a new code.  [facility] Access Code: [email or web address]  Expires: 2/8/2018  2:58 PM    4. Enter your Email address and Date of Birth (mm/dd/yyyy) as indicated and click Submit. You will be taken to the next sign-up page.  5. Create a [facility] ID (case sensitive) . This will be your [facility] login ID and cannot be changed, so think of one that is secure and easy to remember.  6. Create a [facility] password  (case sensitive).   · Your password must be a length of at least 6 characters/digits.  · It must include at least 1 numeric.  · You can change your password at any time.  7. Enter your Password Reset Question and Answer. This can be used at a later time if you forget your password.   8. Enter your e-mail address. You will receive e-mail notification when new information is available in [facility].  9. Click Sign Up. You can now view your medical record.     Additional Information  Please contact [facility] Customer Support at [phone] for any questions . Remember, [facility] is NOT to be used for urgent needs. For medical emergencies, dial 911.          Introducing [facility]    [facility]’s Secure, Online Health Connection      [facility] now offers  convenient, online access to your healthcare team and personal health information.  [employer] makes managing your healthcare easier than ever, and allows you to:  • Email your healthcare provider securely and privately  • Access your test results  • Request prescription refills 24 hours a day  • View your personal medical records from home  • Schedule or change your appointments  • View your Insurance and Billing Information  • Pay bills online ? Coming soon!        Sign below to get started:  I hereby request access to [employer] application.  I agree to abide by the [employer] Terms and Conditions, which will be provided to me upon activating my account.       __________________________________        _________________________  Name (Please Print)          Date of Birth     __________________________________       _________________________  Signature          Primary Care Provider      _______________  Date                          *For Internal Use Only: Please scan this form into the patient’s chart. Click on  - Select Patient - Attach to Encounter:  - Document Type: Consent   - Document Description: MyChart Consent

## 2018-01-09 NOTE — LETTER
January 9, 2018     Juanito Handy Bl[place]  [address] [place] [address]      Dear Juanito:    Thank you for enrolling in [employer]. Please follow the instructions below to securely access your online medical record. [facility] allows you to send messages to your healthcare team, view certain test results, renew your prescriptions, schedule appointments, and more.     How Do I Sign Up?  1. In your Internet browser, go to  [email or web address]  2. Click on the Sign Up Now link in the Sign In box. You will see the New Member Sign Up page.  3. Enter your [facility] Access Code exactly as it appears below (case sensitive). You will not need to use this code after you’ve completed the sign-up process. If you do not sign up before the expiration date, you must request a new code.  [facility] Access Code: Activation code not generated  Current [facility] Status: Patient Declined    4. Enter your Email address and Date of Birth (mm/dd/yyyy) as indicated and click Submit. You will be taken to the next sign-up page.  5. Create a [facility] ID (case sensitive) . This will be your [facility] login ID and cannot be changed, so think of one that is secure and easy to remember.  6. Create a [facility] password  (case sensitive).   · Your password must be a length of at least 6 characters/digits.  · It must include at least 1 numeric.  · You can change your password at any time.  7. Enter your Password Reset Question and Answer. This can be used at a later time if you forget your password.   8. Enter your e-mail address. You will receive e-mail notification when new information is available in [facility].  9. Click Sign Up. You can now view your medical record.     Additional Information  Please contact [employer] Customer Support at [phone] for any questions . Remember, [employer] is NOT to be used for urgent needs. For medical emergencies, dial 911.          Introducing [employer]    [facility]’s Secure, Online Health Connection      [facility] now offers convenient, online access to your healthcare team and personal health information.  [employer] makes managing your healthcare easier than ever, and allows you to:  • Email your healthcare provider securely and privately  • Access your test results  • Request prescription refills 24 hours a day  • View your personal medical records from home  • Schedule or change your appointments  • View your Insurance and Billing Information  • Pay bills online ? Coming soon!        Sign below to get started:  I hereby request access to [email or web address] application.  I agree to abide by the [employer] Terms and Conditions, which will be provided to me upon activating my account.       __________________________________        _________________________  Name (Please Print)          Date of Birth     __________________________________       _________________________  Signature          Primary Care Provider      _______________  Date                          *For Internal Use Only: Please scan this form into the patient’s chart. Click on  - Select Patient - Attach to Encounter:  - Document Type: Consent   - Document Description: MyChart Consent

## 2018-01-10 ENCOUNTER — TELEPHONE (OUTPATIENT)
Dept: INTERNAL MEDICINE | Facility: IMAGING CENTER | Age: 78
End: 2018-01-10

## 2018-01-10 PROBLEM — H44.111 PANUVEITIS OF RIGHT EYE: Status: ACTIVE | Noted: 2018-01-10
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
## 2018-01-15 LAB
25(OH)D3+25(OH)D2 SERPL-MCNC: 51.6 NG/ML (ref 30–100)
ALBUMIN SERPL-MCNC: 3.7 G/DL (ref 3.5–4.8)
ALBUMIN/GLOB SERPL: 1.9 {RATIO} (ref 1.2–2.2)
ALP SERPL-CCNC: 99 IU/L (ref 39–117)
ALT SERPL-CCNC: 8 IU/L (ref 0–44)
AST SERPL-CCNC: 14 IU/L (ref 0–40)
BASOPHILS # BLD AUTO: 0 X10E3/UL (ref 0–0.2)
BASOPHILS NFR BLD AUTO: 0 %
BILIRUB SERPL-MCNC: 0.2 MG/DL (ref 0–1.2)
BUN SERPL-MCNC: 50 MG/DL (ref 8–27)
BUN/CREAT SERPL: 6 (ref 10–24)
CALCIUM SERPL-MCNC: 10 MG/DL (ref 8.6–10.2)
CHLORIDE SERPL-SCNC: 94 MMOL/L (ref 96–106)
CHOLEST SERPL-MCNC: 122 MG/DL (ref 100–199)
CO2 SERPL-SCNC: 27 MMOL/L (ref 18–29)
COMMENT 011824: NORMAL
CORTIS SERPL-MCNC: 14.1 UG/DL
CREAT SERPL-MCNC: 7.88 MG/DL (ref 0.76–1.27)
EOSINOPHIL # BLD AUTO: 0 X10E3/UL (ref 0–0.4)
EOSINOPHIL NFR BLD AUTO: 1 %
ERYTHROCYTE [DISTWIDTH] IN BLOOD BY AUTOMATED COUNT: 14.9 % (ref 12.3–15.4)
GLOBULIN SER CALC-MCNC: 2 G/DL (ref 1.5–4.5)
GLUCOSE SERPL-MCNC: 117 MG/DL (ref 65–99)
HCT VFR BLD AUTO: 29.5 % (ref 37.5–51)
HDLC SERPL-MCNC: 48 MG/DL
HGB BLD-MCNC: 9.8 G/DL (ref 13–17.7)
IF AFRICAN AMERICAN  100797: 7 ML/MIN/1.73
IF NON AFRICAN AMER 100791: 6 ML/MIN/1.73
IMM GRANULOCYTES # BLD: ABNORMAL 10*3/UL
IMM GRANULOCYTES NFR BLD: ABNORMAL %
IMMATURE CELLS  115398: ABNORMAL
LDLC SERPL CALC-MCNC: 57 MG/DL (ref 0–99)
LYMPHOCYTES # BLD AUTO: 0.3 X10E3/UL (ref 0.7–3.1)
LYMPHOCYTES NFR BLD AUTO: 12 %
MCH RBC QN AUTO: 34.4 PG (ref 26.6–33)
MCHC RBC AUTO-ENTMCNC: 33.2 G/DL (ref 31.5–35.7)
MCV RBC AUTO: 104 FL (ref 79–97)
MONOCYTES # BLD AUTO: 0.2 X10E3/UL (ref 0.1–0.9)
MONOCYTES NFR BLD AUTO: 10 %
MORPHOLOGY BLD-IMP: ABNORMAL
NEUTROPHILS # BLD AUTO: 1.7 X10E3/UL (ref 1.4–7)
NEUTROPHILS NFR BLD AUTO: 77 %
NRBC BLD AUTO-RTO: ABNORMAL %
PLATELET # BLD AUTO: 78 X10E3/UL (ref 150–379)
POTASSIUM SERPL-SCNC: 4.6 MMOL/L (ref 3.5–5.2)
PROT SERPL-MCNC: 5.7 G/DL (ref 6–8.5)
PSA SERPL-MCNC: 0.4 NG/ML (ref 0–4)
RBC # BLD AUTO: 2.85 X10E6/UL (ref 4.14–5.8)
SODIUM SERPL-SCNC: 136 MMOL/L (ref 134–144)
TRIGL SERPL-MCNC: 85 MG/DL (ref 0–149)
TSH SERPL DL<=0.005 MIU/L-ACNC: 1.24 UIU/ML (ref 0.45–4.5)
VLDLC SERPL CALC-MCNC: 17 MG/DL (ref 5–40)
WBC # BLD AUTO: 2.2 X10E3/UL (ref 3.4–10.8)

## 2018-01-16 ENCOUNTER — TELEPHONE (OUTPATIENT)
Dept: INTERNAL MEDICINE | Facility: IMAGING CENTER | Age: 78
End: 2018-01-16

## 2018-01-17 LAB
FUNGUS SPEC CULT: NORMAL
SIGNIFICANT IND 70042: NORMAL
SITE SITE: NORMAL
SOURCE SOURCE: NORMAL

## 2018-01-22 ENCOUNTER — HOSPITAL ENCOUNTER (INPATIENT)
Dept: HOSPITAL 8 - ED | Age: 78
LOS: 3 days | Discharge: HOME HEALTH | DRG: 91 | End: 2018-01-25
Attending: FAMILY MEDICINE | Admitting: FAMILY MEDICINE
Payer: MEDICARE

## 2018-01-22 VITALS — BODY MASS INDEX: 21.98 KG/M2 | HEIGHT: 71 IN | WEIGHT: 156.97 LBS

## 2018-01-22 DIAGNOSIS — Z99.2: ICD-10-CM

## 2018-01-22 DIAGNOSIS — M54.50 LUMBAR PAIN WITH RADIATION DOWN LEG: ICD-10-CM

## 2018-01-22 DIAGNOSIS — Z79.01: ICD-10-CM

## 2018-01-22 DIAGNOSIS — E27.40: ICD-10-CM

## 2018-01-22 DIAGNOSIS — Z87.891: ICD-10-CM

## 2018-01-22 DIAGNOSIS — E43: ICD-10-CM

## 2018-01-22 DIAGNOSIS — M79.606 LUMBAR PAIN WITH RADIATION DOWN LEG: ICD-10-CM

## 2018-01-22 DIAGNOSIS — G89.29: ICD-10-CM

## 2018-01-22 DIAGNOSIS — M51.36 DDD (DEGENERATIVE DISC DISEASE), LUMBAR: ICD-10-CM

## 2018-01-22 DIAGNOSIS — M48.56XA: ICD-10-CM

## 2018-01-22 DIAGNOSIS — Z82.49: ICD-10-CM

## 2018-01-22 DIAGNOSIS — N25.0: ICD-10-CM

## 2018-01-22 DIAGNOSIS — D68.69: ICD-10-CM

## 2018-01-22 DIAGNOSIS — M62.838 MUSCLE SPASM: ICD-10-CM

## 2018-01-22 DIAGNOSIS — M54.10: ICD-10-CM

## 2018-01-22 DIAGNOSIS — Z86.718: ICD-10-CM

## 2018-01-22 DIAGNOSIS — D61.818: ICD-10-CM

## 2018-01-22 DIAGNOSIS — I12.0: ICD-10-CM

## 2018-01-22 DIAGNOSIS — Z99.81: ICD-10-CM

## 2018-01-22 DIAGNOSIS — J44.9: ICD-10-CM
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
## 2018-01-22 DIAGNOSIS — N18.6: ICD-10-CM

## 2018-01-22 DIAGNOSIS — C88.0: ICD-10-CM

## 2018-01-22 DIAGNOSIS — Z85.72: ICD-10-CM

## 2018-01-22 DIAGNOSIS — T40.4X5A: ICD-10-CM

## 2018-01-22 DIAGNOSIS — J98.11: ICD-10-CM

## 2018-01-22 DIAGNOSIS — J96.10: ICD-10-CM

## 2018-01-22 DIAGNOSIS — G92: Primary | ICD-10-CM

## 2018-01-22 DIAGNOSIS — Z87.01: ICD-10-CM

## 2018-01-22 PROCEDURE — 83970 ASSAY OF PARATHORMONE: CPT

## 2018-01-22 PROCEDURE — 71045 X-RAY EXAM CHEST 1 VIEW: CPT

## 2018-01-22 PROCEDURE — 83540 ASSAY OF IRON: CPT

## 2018-01-22 PROCEDURE — 70450 CT HEAD/BRAIN W/O DYE: CPT

## 2018-01-22 PROCEDURE — 87040 BLOOD CULTURE FOR BACTERIA: CPT

## 2018-01-22 PROCEDURE — 84550 ASSAY OF BLOOD/URIC ACID: CPT

## 2018-01-22 PROCEDURE — 36415 COLL VENOUS BLD VENIPUNCTURE: CPT

## 2018-01-22 PROCEDURE — 93005 ELECTROCARDIOGRAM TRACING: CPT

## 2018-01-22 PROCEDURE — 70551 MRI BRAIN STEM W/O DYE: CPT

## 2018-01-22 PROCEDURE — 83550 IRON BINDING TEST: CPT

## 2018-01-22 PROCEDURE — 82728 ASSAY OF FERRITIN: CPT

## 2018-01-22 PROCEDURE — 85025 COMPLETE CBC W/AUTO DIFF WBC: CPT

## 2018-01-22 PROCEDURE — 72148 MRI LUMBAR SPINE W/O DYE: CPT

## 2018-01-22 PROCEDURE — 80053 COMPREHEN METABOLIC PANEL: CPT

## 2018-01-22 PROCEDURE — 84439 ASSAY OF FREE THYROXINE: CPT

## 2018-01-22 PROCEDURE — 85610 PROTHROMBIN TIME: CPT

## 2018-01-22 PROCEDURE — 82306 VITAMIN D 25 HYDROXY: CPT

## 2018-01-22 PROCEDURE — 84100 ASSAY OF PHOSPHORUS: CPT

## 2018-01-22 PROCEDURE — 80048 BASIC METABOLIC PNL TOTAL CA: CPT

## 2018-01-22 PROCEDURE — 83735 ASSAY OF MAGNESIUM: CPT

## 2018-01-22 PROCEDURE — 83605 ASSAY OF LACTIC ACID: CPT

## 2018-01-22 PROCEDURE — 99285 EMERGENCY DEPT VISIT HI MDM: CPT

## 2018-01-22 PROCEDURE — 84443 ASSAY THYROID STIM HORMONE: CPT

## 2018-01-22 PROCEDURE — 85730 THROMBOPLASTIN TIME PARTIAL: CPT

## 2018-01-22 RX ORDER — GABAPENTIN 100 MG/1
100 CAPSULE ORAL 3 TIMES DAILY
Qty: 90 CAP | Refills: 2 | Status: SHIPPED | OUTPATIENT
Start: 2018-01-22 | End: 2018-03-20

## 2018-01-22 NOTE — PROGRESS NOTES
Spoke with patient's daughter at length this morning on behalf of pt - she reports that the Ultram is helping with his back pain but he continues to have radicular symptoms down his legs at times. We discussed starting low dose neurontin to help with radicular symptom control, and she agrees that her father would be open to that. He is also ready to go forward with MRI of L spine and she has confirmed that his permacath is MRI compatible per dialysis team today. She is having issues with [employer] in obtaining a portable oxygen concentrator via Dr. Mcknight's office, and I will have Amina STAFFORD call [facility] to see what the delay is/ how we can help facilitate this process. Our office will contact her when we have more information about items above.

## 2018-01-23 ENCOUNTER — TELEPHONE (OUTPATIENT)
Dept: INTERNAL MEDICINE | Facility: IMAGING CENTER | Age: 78
End: 2018-01-23

## 2018-01-23 VITALS — DIASTOLIC BLOOD PRESSURE: 100 MMHG | SYSTOLIC BLOOD PRESSURE: 199 MMHG

## 2018-01-23 VITALS — DIASTOLIC BLOOD PRESSURE: 73 MMHG | SYSTOLIC BLOOD PRESSURE: 179 MMHG

## 2018-01-23 VITALS — DIASTOLIC BLOOD PRESSURE: 82 MMHG | SYSTOLIC BLOOD PRESSURE: 179 MMHG

## 2018-01-23 VITALS — SYSTOLIC BLOOD PRESSURE: 189 MMHG | DIASTOLIC BLOOD PRESSURE: 79 MMHG

## 2018-01-23 VITALS — SYSTOLIC BLOOD PRESSURE: 164 MMHG | DIASTOLIC BLOOD PRESSURE: 81 MMHG

## 2018-01-23 LAB
ALBUMIN SERPL-MCNC: 3.1 G/DL (ref 3.4–5)
ALP SERPL-CCNC: 99 U/L (ref 45–117)
ALT SERPL-CCNC: 11 U/L (ref 12–78)
ANION GAP SERPL CALC-SCNC: 12 MMOL/L (ref 5–15)
APTT BLD: 22 SECONDS (ref 25–31)
BASOPHILS # BLD AUTO: 0 X10^3/UL (ref 0–0.1)
BASOPHILS NFR BLD AUTO: 0 % (ref 0–1)
BILIRUB SERPL-MCNC: 0.5 MG/DL (ref 0.2–1)
CALCIUM SERPL-MCNC: 9 MG/DL (ref 8.5–10.1)
CHLORIDE SERPL-SCNC: 100 MMOL/L (ref 98–107)
CREAT SERPL-MCNC: 5.63 MG/DL (ref 0.7–1.3)
EOSINOPHIL # BLD AUTO: 0.01 X10^3/UL (ref 0–0.4)
EOSINOPHIL NFR BLD AUTO: 0 % (ref 1–7)
ERYTHROCYTE [DISTWIDTH] IN BLOOD BY AUTOMATED COUNT: 15.8 % (ref 9.4–14.8)
INR PPP: 1.07 (ref 0.93–1.1)
LYMPHOCYTES # BLD AUTO: 0.25 X10^3/UL (ref 1–3.4)
LYMPHOCYTES NFR BLD AUTO: 9 % (ref 22–44)
MCH RBC QN AUTO: 34.9 PG (ref 27.5–34.5)
MCHC RBC AUTO-ENTMCNC: 32.9 G/DL (ref 33.2–36.2)
MCV RBC AUTO: 106 FL (ref 81–97)
MD: NO
MONOCYTES # BLD AUTO: 0.19 X10^3/UL (ref 0.2–0.8)
MONOCYTES NFR BLD AUTO: 7 % (ref 2–9)
NEUTROPHILS # BLD AUTO: 2.41 X10^3/UL (ref 1.8–6.8)
NEUTROPHILS NFR BLD AUTO: 84 % (ref 42–75)
PLATELET # BLD AUTO: 117 X10^3/UL (ref 130–400)
PMV BLD AUTO: 6.7 FL (ref 7.4–10.4)
PROT SERPL-MCNC: 6.5 G/DL (ref 6.4–8.2)
PROTHROMBIN TIME: 11.1 SECONDS (ref 9.6–11.5)
RBC # BLD AUTO: 3.31 X10^6/UL (ref 4.38–5.82)

## 2018-01-23 RX ADMIN — DOCUSATE SODIUM 50MG AND SENNOSIDES 8.6MG SCH TAB: 8.6; 5 TABLET, FILM COATED ORAL at 08:27

## 2018-01-23 RX ADMIN — Medication SCH MG: at 08:27

## 2018-01-23 RX ADMIN — ACETAMINOPHEN PRN MG: 325 TABLET, FILM COATED ORAL at 03:52

## 2018-01-23 RX ADMIN — ACETAMINOPHEN PRN MG: 325 TABLET, FILM COATED ORAL at 23:48

## 2018-01-23 RX ADMIN — BUDESONIDE AND FORMOTEROL FUMARATE DIHYDRATE SCH DROP: 160; 4.5 AEROSOL RESPIRATORY (INHALATION) at 15:43

## 2018-01-23 RX ADMIN — HYDRALAZINE HYDROCHLORIDE PRN MG: 20 INJECTION INTRAMUSCULAR; INTRAVENOUS at 03:43

## 2018-01-23 RX ADMIN — SENNOSIDES SCH MG: 8.6 TABLET, FILM COATED ORAL at 08:28

## 2018-01-23 RX ADMIN — BUDESONIDE AND FORMOTEROL FUMARATE DIHYDRATE SCH DROP: 160; 4.5 AEROSOL RESPIRATORY (INHALATION) at 05:28

## 2018-01-23 RX ADMIN — BUDESONIDE AND FORMOTEROL FUMARATE DIHYDRATE SCH DROP: 160; 4.5 AEROSOL RESPIRATORY (INHALATION) at 21:00

## 2018-01-23 RX ADMIN — LABETALOL HYDROCHLORIDE PRN MG: 5 INJECTION INTRAVENOUS at 23:16

## 2018-01-23 RX ADMIN — FOLIC ACID SCH MG: 1 TABLET ORAL at 09:00

## 2018-01-23 RX ADMIN — LABETALOL HYDROCHLORIDE PRN MG: 5 INJECTION INTRAVENOUS at 08:29

## 2018-01-23 RX ADMIN — APIXABAN SCH MG: 2.5 TABLET, FILM COATED ORAL at 08:28

## 2018-01-23 RX ADMIN — BUDESONIDE AND FORMOTEROL FUMARATE DIHYDRATE SCH DROP: 160; 4.5 AEROSOL RESPIRATORY (INHALATION) at 11:00

## 2018-01-23 RX ADMIN — Medication SCH MG: at 09:00

## 2018-01-23 RX ADMIN — APIXABAN SCH MG: 2.5 TABLET, FILM COATED ORAL at 21:32

## 2018-01-23 RX ADMIN — FOLIC ACID SCH MG: 1 TABLET ORAL at 08:40

## 2018-01-23 RX ADMIN — ACETAMINOPHEN PRN MG: 325 TABLET, FILM COATED ORAL at 17:25

## 2018-01-24 ENCOUNTER — TELEPHONE (OUTPATIENT)
Dept: INTERNAL MEDICINE | Facility: IMAGING CENTER | Age: 78
End: 2018-01-24

## 2018-01-24 VITALS — DIASTOLIC BLOOD PRESSURE: 73 MMHG | SYSTOLIC BLOOD PRESSURE: 149 MMHG

## 2018-01-24 VITALS — DIASTOLIC BLOOD PRESSURE: 83 MMHG | SYSTOLIC BLOOD PRESSURE: 177 MMHG

## 2018-01-24 VITALS — SYSTOLIC BLOOD PRESSURE: 151 MMHG | DIASTOLIC BLOOD PRESSURE: 86 MMHG

## 2018-01-24 VITALS — DIASTOLIC BLOOD PRESSURE: 89 MMHG | SYSTOLIC BLOOD PRESSURE: 187 MMHG

## 2018-01-24 VITALS — SYSTOLIC BLOOD PRESSURE: 125 MMHG | DIASTOLIC BLOOD PRESSURE: 70 MMHG

## 2018-01-24 LAB
ANION GAP SERPL CALC-SCNC: 12 MMOL/L (ref 5–15)
CALCIUM SERPL-MCNC: 10.1 MG/DL (ref 8.5–10.1)
CHLORIDE SERPL-SCNC: 99 MMOL/L (ref 98–107)
CREAT SERPL-MCNC: 8.8 MG/DL (ref 0.7–1.3)
T4 FREE SERPL-MCNC: 1.41 NG/DL (ref 0.76–1.46)
TSH SERPL-ACNC: 1.48 MIU/L (ref 0.36–3.74)

## 2018-01-24 PROCEDURE — 5A1D70Z PERFORMANCE OF URINARY FILTRATION, INTERMITTENT, LESS THAN 6 HOURS PER DAY: ICD-10-PCS | Performed by: INTERNAL MEDICINE

## 2018-01-24 RX ADMIN — DOCUSATE SODIUM 50MG AND SENNOSIDES 8.6MG SCH TAB: 8.6; 5 TABLET, FILM COATED ORAL at 09:00

## 2018-01-24 RX ADMIN — APIXABAN SCH MG: 5 TABLET, FILM COATED ORAL at 21:32

## 2018-01-24 RX ADMIN — GABAPENTIN SCH MG: 100 CAPSULE ORAL at 21:32

## 2018-01-24 RX ADMIN — HYDROCORTISONE SCH MG: 10 TABLET ORAL at 10:05

## 2018-01-24 RX ADMIN — GABAPENTIN SCH MG: 100 CAPSULE ORAL at 11:54

## 2018-01-24 RX ADMIN — ACETAMINOPHEN SCH MG: 500 TABLET, COATED ORAL at 21:33

## 2018-01-24 RX ADMIN — HYDRALAZINE HYDROCHLORIDE PRN MG: 20 INJECTION INTRAMUSCULAR; INTRAVENOUS at 01:53

## 2018-01-24 RX ADMIN — Medication SCH MG: at 11:25

## 2018-01-24 RX ADMIN — APIXABAN SCH MG: 2.5 TABLET, FILM COATED ORAL at 10:05

## 2018-01-24 RX ADMIN — ACETAMINOPHEN SCH MG: 500 TABLET, COATED ORAL at 11:54

## 2018-01-24 RX ADMIN — FOLIC ACID SCH MG: 1 TABLET ORAL at 11:24

## 2018-01-24 RX ADMIN — SENNOSIDES SCH MG: 8.6 TABLET, FILM COATED ORAL at 09:00

## 2018-01-24 RX ADMIN — BUDESONIDE AND FORMOTEROL FUMARATE DIHYDRATE SCH DROP: 160; 4.5 AEROSOL RESPIRATORY (INHALATION) at 21:00

## 2018-01-24 RX ADMIN — GABAPENTIN SCH MG: 100 CAPSULE ORAL at 16:00

## 2018-01-24 RX ADMIN — BUDESONIDE AND FORMOTEROL FUMARATE DIHYDRATE SCH DROP: 160; 4.5 AEROSOL RESPIRATORY (INHALATION) at 05:49

## 2018-01-24 RX ADMIN — BUDESONIDE AND FORMOTEROL FUMARATE DIHYDRATE SCH DROP: 160; 4.5 AEROSOL RESPIRATORY (INHALATION) at 11:00

## 2018-01-24 RX ADMIN — BUDESONIDE AND FORMOTEROL FUMARATE DIHYDRATE SCH DROP: 160; 4.5 AEROSOL RESPIRATORY (INHALATION) at 16:00

## 2018-01-24 NOTE — TELEPHONE ENCOUNTER
Spoke with patient's daughter Radha this morning regarding his status. He remains hospitalized [facility] and continues to improve from a mentation standpoint. Radha was able to speak to Dr. Rodriguez his hospitalist yesterday, and he would like a copy of his recent outpatient labs. Radha is very encouraged by his progress and also reported that they will be able to obtain MRI imaging as inpatient. I will cancel MRI scheduled in our system for him and will fax lab results to [phone] - I called and spoke with his RN Sandra to alert her of incoming fax and she will place records in his chart for Dr. Rodriguez to review today.

## 2018-01-24 NOTE — TELEPHONE ENCOUNTER
Spoke with Radha, pt's daughter, who detailed that he became altered at home last night and was slurring words. He was transported to [facility] and is now hospitalized. He is feeling better today and is able to recognize her and is less altered. Work up is in progress but no definitive diagnosis was known when I spoke with her earlier today. She did report that her father did not change medications nor start low dose neurontin prior to this change in mental status last night. Support provided and will continue to follow this concerning situation.

## 2018-01-25 ENCOUNTER — APPOINTMENT (OUTPATIENT)
Dept: RADIOLOGY | Facility: MEDICAL CENTER | Age: 78
End: 2018-01-25
Attending: FAMILY MEDICINE
Payer: MEDICARE

## 2018-01-25 VITALS — SYSTOLIC BLOOD PRESSURE: 142 MMHG | DIASTOLIC BLOOD PRESSURE: 80 MMHG

## 2018-01-25 VITALS — DIASTOLIC BLOOD PRESSURE: 78 MMHG | SYSTOLIC BLOOD PRESSURE: 158 MMHG

## 2018-01-25 VITALS — SYSTOLIC BLOOD PRESSURE: 149 MMHG | DIASTOLIC BLOOD PRESSURE: 77 MMHG

## 2018-01-25 LAB
% IRON SATURATION: 19 % (ref 20–55)
ALBUMIN SERPL-MCNC: 2.9 G/DL (ref 3.4–5)
ALP SERPL-CCNC: 93 U/L (ref 45–117)
ALT SERPL-CCNC: 11 U/L (ref 12–78)
ANION GAP SERPL CALC-SCNC: 9 MMOL/L (ref 5–15)
BASOPHILS # BLD AUTO: 0.03 X10^3/UL (ref 0–0.1)
BASOPHILS NFR BLD AUTO: 1 % (ref 0–1)
BILIRUB SERPL-MCNC: 0.4 MG/DL (ref 0.2–1)
CALCIUM SERPL-MCNC: 9 MG/DL (ref 8.5–10.1)
CHLORIDE SERPL-SCNC: 98 MMOL/L (ref 98–107)
CREAT SERPL-MCNC: 5.77 MG/DL (ref 0.7–1.3)
EOSINOPHIL # BLD AUTO: 0.07 X10^3/UL (ref 0–0.4)
EOSINOPHIL NFR BLD AUTO: 2 % (ref 1–7)
ERYTHROCYTE [DISTWIDTH] IN BLOOD BY AUTOMATED COUNT: 15.8 % (ref 9.4–14.8)
IRON LEVEL: 40 MCG/DL (ref 65–175)
LYMPHOCYTES # BLD AUTO: 0.48 X10^3/UL (ref 1–3.4)
LYMPHOCYTES NFR BLD AUTO: 17 % (ref 22–44)
MCH RBC QN AUTO: 35.2 PG (ref 27.5–34.5)
MCHC RBC AUTO-ENTMCNC: 33.1 G/DL (ref 33.2–36.2)
MCV RBC AUTO: 106.1 FL (ref 81–97)
MD: (no result)
MONOCYTES # BLD AUTO: 0.31 X10^3/UL (ref 0.2–0.8)
MONOCYTES NFR BLD AUTO: 11 % (ref 2–9)
NEUTROPHILS # BLD AUTO: 1.86 X10^3/UL (ref 1.8–6.8)
NEUTROPHILS NFR BLD AUTO: 68 % (ref 42–75)
PLATELET # BLD AUTO: 103 X10^3/UL (ref 130–400)
PMV BLD AUTO: 6.8 FL (ref 7.4–10.4)
PROT SERPL-MCNC: 5.8 G/DL (ref 6.4–8.2)
RBC # BLD AUTO: 3.08 X10^6/UL (ref 4.38–5.82)
TIBC SERPL-MCNC: 206 MCG/DL (ref 250–450)

## 2018-01-25 RX ADMIN — FOLIC ACID SCH MG: 1 TABLET ORAL at 08:24

## 2018-01-25 RX ADMIN — DOCUSATE SODIUM 50MG AND SENNOSIDES 8.6MG SCH TAB: 8.6; 5 TABLET, FILM COATED ORAL at 08:26

## 2018-01-25 RX ADMIN — BUDESONIDE AND FORMOTEROL FUMARATE DIHYDRATE SCH DROP: 160; 4.5 AEROSOL RESPIRATORY (INHALATION) at 05:26

## 2018-01-25 RX ADMIN — ACETAMINOPHEN SCH MG: 500 TABLET, COATED ORAL at 08:25

## 2018-01-25 RX ADMIN — BUDESONIDE AND FORMOTEROL FUMARATE DIHYDRATE SCH DROP: 160; 4.5 AEROSOL RESPIRATORY (INHALATION) at 16:00

## 2018-01-25 RX ADMIN — SENNOSIDES SCH MG: 8.6 TABLET, FILM COATED ORAL at 08:27

## 2018-01-25 RX ADMIN — GABAPENTIN SCH MG: 100 CAPSULE ORAL at 16:27

## 2018-01-25 RX ADMIN — HYDROCORTISONE SCH MG: 10 TABLET ORAL at 08:25

## 2018-01-25 RX ADMIN — APIXABAN SCH MG: 5 TABLET, FILM COATED ORAL at 08:24

## 2018-01-25 RX ADMIN — Medication SCH MG: at 08:24

## 2018-01-25 RX ADMIN — GABAPENTIN SCH MG: 100 CAPSULE ORAL at 08:25

## 2018-01-25 RX ADMIN — BUDESONIDE AND FORMOTEROL FUMARATE DIHYDRATE SCH DROP: 160; 4.5 AEROSOL RESPIRATORY (INHALATION) at 11:00

## 2018-01-26 ENCOUNTER — TELEPHONE (OUTPATIENT)
Dept: INTERNAL MEDICINE | Facility: IMAGING CENTER | Age: 78
End: 2018-01-26

## 2018-01-26 NOTE — TELEPHONE ENCOUNTER
Spoke with Radha today and she reports that her father was discharged home yesterday from the hospital. The work up revealed that ultram had built up in his system causing acute encephalopathy. MRI was done (she will fax report to us) and medications for pain control were adjusted - he has appointment February 1 with me. All questions answered/ also informed her that we faxed information to [facility] [employer] to assist with portable oxygen equipment.

## 2018-02-02 ENCOUNTER — OFFICE VISIT (OUTPATIENT)
Dept: INTERNAL MEDICINE | Facility: IMAGING CENTER | Age: 78
End: 2018-02-02
Payer: MEDICARE

## 2018-02-02 VITALS
DIASTOLIC BLOOD PRESSURE: 90 MMHG | HEIGHT: 71 IN | SYSTOLIC BLOOD PRESSURE: 130 MMHG | RESPIRATION RATE: 14 BRPM | WEIGHT: 170 LBS | BODY MASS INDEX: 23.8 KG/M2 | HEART RATE: 101 BPM | TEMPERATURE: 98.2 F | OXYGEN SATURATION: 93 %

## 2018-02-02 DIAGNOSIS — M54.10 BACK PAIN WITH LEFT-SIDED RADICULOPATHY: Chronic | ICD-10-CM

## 2018-02-02 DIAGNOSIS — C88.0 WALDENSTROM MACROGLOBULINEMIA (HCC): Chronic | ICD-10-CM

## 2018-02-02 DIAGNOSIS — R10.9 FLANK PAIN: ICD-10-CM

## 2018-02-02 DIAGNOSIS — Z99.81 OXYGEN DEPENDENT: ICD-10-CM

## 2018-02-02 DIAGNOSIS — N18.6 END STAGE RENAL DISEASE ON DIALYSIS (HCC): Chronic | ICD-10-CM

## 2018-02-02 DIAGNOSIS — M48.56XS COMPRESSION FRACTURE OF LUMBAR VERTEBRA, NON-TRAUMATIC, SEQUELA: Chronic | ICD-10-CM

## 2018-02-02 DIAGNOSIS — Z79.52 STEROID DEPENDENT FOR ADRENAL SUPRESSION: Chronic | ICD-10-CM

## 2018-02-02 DIAGNOSIS — M51.36 DDD (DEGENERATIVE DISC DISEASE), LUMBAR: Chronic | ICD-10-CM

## 2018-02-02 DIAGNOSIS — Z99.2 END STAGE RENAL DISEASE ON DIALYSIS (HCC): Chronic | ICD-10-CM

## 2018-02-02 DIAGNOSIS — R53.81 PHYSICAL DEBILITY: Chronic | ICD-10-CM

## 2018-02-02 DIAGNOSIS — M41.25 OTHER IDIOPATHIC SCOLIOSIS, THORACOLUMBAR REGION: Chronic | ICD-10-CM

## 2018-02-02 PROBLEM — J44.89 CHRONIC BRONCHITIS WITH COPD (CHRONIC OBSTRUCTIVE PULMONARY DISEASE) (HCC): Chronic | Status: RESOLVED | Noted: 2018-01-07 | Resolved: 2018-02-02

## 2018-02-02 LAB
APPEARANCE UR: CLEAR
BILIRUB UR STRIP-MCNC: NEGATIVE MG/DL
COLOR UR AUTO: YELLOW
GLUCOSE UR STRIP.AUTO-MCNC: NEGATIVE MG/DL
KETONES UR STRIP.AUTO-MCNC: NEGATIVE MG/DL
LEUKOCYTE ESTERASE UR QL STRIP.AUTO: NEGATIVE
NITRITE UR QL STRIP.AUTO: NEGATIVE
PH UR STRIP.AUTO: 8 [PH] (ref 5–8)
PROT UR QL STRIP: 30 MG/DL
RBC UR QL AUTO: NEGATIVE
SP GR UR STRIP.AUTO: 1
UROBILINOGEN UR STRIP-MCNC: NEGATIVE MG/DL

## 2018-02-02 PROCEDURE — 81002 URINALYSIS NONAUTO W/O SCOPE: CPT | Performed by: FAMILY MEDICINE

## 2018-02-02 PROCEDURE — 99214 OFFICE O/P EST MOD 30 MIN: CPT | Performed by: FAMILY MEDICINE

## 2018-02-02 RX ORDER — METHOCARBAMOL 500 MG/1
1000 TABLET, FILM COATED ORAL 3 TIMES DAILY PRN
COMMUNITY
End: 2018-02-12 | Stop reason: SDUPTHER

## 2018-02-02 RX ORDER — MELOXICAM 15 MG/1
15 TABLET ORAL DAILY
COMMUNITY
End: 2018-02-12 | Stop reason: SDUPTHER

## 2018-02-02 ASSESSMENT — PATIENT HEALTH QUESTIONNAIRE - PHQ9: CLINICAL INTERPRETATION OF PHQ2 SCORE: 0

## 2018-02-02 NOTE — PROGRESS NOTES
"Chief Complaint   Patient presents with   • Low Back Pain   • Chronic Kidney Disease       HPI:  Patient is a 77 y.o. male established patient who presents today with his daughter for a follow up appointment after being hospitalized last week at [facility] due to acute encephalopathy. It was determined that his acute mental status change was due to Tramadol use to control back pain and that medication was discontinued. He is now taking Meloxicam 15 mg daily and methocarbamol 500 mg TID for pain control in addition to neurontin 100 mg BID (allowed by Dr. Gleason). Pt reports ongoing drowsiness from medications and spends most of day/night in bed resting. During his hospital stay, he was able to obtain MRI brain (negative) and L spine which showed L1-L5 mild to moderate old compression fractures, diffuse DDD, and L4-5 broad base disc protrusion. He is current complaining of L buttock pain with radiation down back of his L leg and \"new\" L flank pain of unclear etiology. In regards to his new pain concerns, he denies aggravating factors/no new trauma but states that the pains have been present since being hospitalized last week (sleeping in hospital bed may have been a trigger). He remains very limited in all activities of life due to his ongoing back issues and would like a referral [place] [facility] [place] treatment evaluation. He is also finishing home PT for general debility and would like to continue outpatient PT at [facility] in [facility]. He did bring a urine sample in today for testing but denies overt symptoms of UTI. He has CKD requiring HD and is due for dialysis later today. He also has CHRF/O2 dependent COPD and is saturating 88% on room air today/ 93% with 2L NC O2. 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 He also has known Waldenstrom macroglobulinemia in remission " "managed by Dr. Jackson.     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   • Chronic anticoagulation 01/09/2018   • Secondary hypercoagulable state (CMS-HCC) 01/09/2018   • Macrocytic anemia 01/07/2018   • Pancytopenia (CMS-HCC) 01/07/2018   • Oxygen dependent 01/07/2018   • Waldenstrom macroglobulinemia (CMS-HCC) 01/07/2018   • End stage renal disease on dialysis (CMS-HCC) 01/07/2018   • COPD (chronic obstructive pulmonary disease) (CMS-HCC) 01/07/2018   • History of DVT (deep vein thrombosis) 01/07/2018   • Recurrent pneumonia 01/07/2018   • Steroid dependent for adrenal supression 01/07/2018   • Renal osteodystrophy 01/07/2018   • H/O aspergillosis 01/07/2018   • History of tobacco abuse 01/07/2018     Past medical, surgical, family, and social history was reviewed and updated in Epic chart by me today.     Medications and allergies reviewed and updated in Epic chart by me today.     [facility] records incomplete per multiple requests and not available for review at our visit today.     ROS:  Pertinent positives listed above in HPI. All other systems have been reviewed and are negative.    PE:   /90   Pulse (!) 101   Temp 36.8 °C (98.2 °F)   Resp 14   Ht 1.803 m (5' 10.98\")   Wt 77.1 kg (170 lb)   SpO2 93%   BMI 23.72 kg/m²   Vital signs reviewed with patient.     Gen: Well developed; well nourished; no acute distress; non toxic but older than stated age in appearance   CV: Regular rate and rhythm; S1/ S2 present; no murmur, gallop or rub noted  Chest: R perma cath in place  Pulm: No respiratory distress; diminished BS b/l; no wheezing or stridor noted b/l; NC oxygen in " place  Abd: Adequate bowel sounds noted; soft and nontender; no rebound, rigidity, nor distention  Extremities: No significant peripheral edema b/l LE extremities/ no clubbing nor cyanosis noted  Back: scoliosis present; pain with palpation L sciatic notch; cannot reproduce his L flank pain and no skin lesions noted in area of his pain concern (prior history of shingles and remains at high risk)  Skin: Warm and dry; no new rashes noted   Neuro: No focal deficits noted   Psych: AAOx4; mood and affect are appropriate    A/P:  1. Flank pain  POCT UA negative for infection/ I am unable to reproduce his flank pain today. No overt signs of systemic infection and likely musculoskeletal in origin/ Recommend supportive care including ice or heat to protected skin area.   - POCT Urinalysis    2. Chronic oxygen dependent  Stable; 88% room air saturation today/ 93% on 2L nasal cannula O2    3. End stage renal disease on dialysis (CMS-HCC)  M/W/F HD managed by [facility]    4. Steroid dependent for adrenal supression  Stable; Recommend patient take 5 mg every other day with 10 mg on HD days for stress dosing and follow response.     5. Waldenstrom macroglobulinemia (CMS-[facility])  In remission; managed by Dr. Jackson    6. Compression fracture of lumbar vertebra, non-traumatic, sequela  Ongoing concern and one source of pain; Will refer to [facility] and [facility] outpatient PT  - REFERRAL TO PHYSICAL THERAPY Reason for Therapy: Eval/Treat/Report  - REFERRAL TO NEUROSURGERY    7. Other idiopathic scoliosis, thoracolumbar region  Chronic/unchanged  - REFERRAL TO PHYSICAL THERAPY Reason for Therapy: Eval/Treat/Report  - REFERRAL TO NEUROSURGERY    8. DDD (degenerative disc disease), lumbar  Ongoing concern and a source of pain  - REFERRAL TO PHYSICAL THERAPY Reason for Therapy: Eval/Treat/Report  - REFERRAL TO NEUROSURGERY    9. Physical debility  Pt uses walker for safe mobility and will benefit from ongoing PT at [facility].   -  REFERRAL TO PHYSICAL THERAPY Reason for Therapy: Eval/Treat/Report  - REFERRAL TO NEUROSURGERY    10. Back pain with left-sided radiculopathy  Ongoing issue not well controlled with oral medication/ will refer to [facility] for further evaluation (recent MRI results detailed above). Recommend that patient take neurontin 100 mg qhs instead of BID to see if this will decrease his drowsiness during daytime hours. He can continue daily meloxicam and PRN methocarbamol for symptom relief.   - REFERRAL TO PHYSICAL THERAPY Reason for Therapy: Eval/Treat/Report  - REFERRAL TO NEUROSURGERY    Amina STAFFORD will also provide patient and daughter with senior helper resources in community. Pt will let me know if current condition changes (he remains at high risk for decompensation due to multiple medical comorbid conditions).

## 2018-02-05 ENCOUNTER — HOSPITAL ENCOUNTER (OUTPATIENT)
Dept: LAB | Facility: MEDICAL CENTER | Age: 78
End: 2018-02-05
Attending: INTERNAL MEDICINE
Payer: MEDICARE

## 2018-02-05 LAB
ALBUMIN SERPL BCP-MCNC: 3.5 G/DL (ref 3.2–4.9)
ALBUMIN/GLOB SERPL: 1.6 G/DL
ALP SERPL-CCNC: 91 U/L (ref 30–99)
ALT SERPL-CCNC: 9 U/L (ref 2–50)
ANION GAP SERPL CALC-SCNC: 15 MMOL/L (ref 0–11.9)
ANISOCYTOSIS BLD QL SMEAR: ABNORMAL
AST SERPL-CCNC: 11 U/L (ref 12–45)
BASOPHILS # BLD AUTO: 4.4 % (ref 0–1.8)
BASOPHILS # BLD: 0.09 K/UL (ref 0–0.12)
BILIRUB SERPL-MCNC: 0.3 MG/DL (ref 0.1–1.5)
BUN SERPL-MCNC: 60 MG/DL (ref 8–22)
CALCIUM SERPL-MCNC: 10 MG/DL (ref 8.5–10.5)
CHLORIDE SERPL-SCNC: 95 MMOL/L (ref 96–112)
CO2 SERPL-SCNC: 24 MMOL/L (ref 20–33)
CREAT SERPL-MCNC: 9.16 MG/DL (ref 0.5–1.4)
EOSINOPHIL # BLD AUTO: 0.16 K/UL (ref 0–0.51)
EOSINOPHIL NFR BLD: 7.8 % (ref 0–6.9)
ERYTHROCYTE [DISTWIDTH] IN BLOOD BY AUTOMATED COUNT: 56.4 FL (ref 35.9–50)
GLOBULIN SER CALC-MCNC: 2.2 G/DL (ref 1.9–3.5)
GLUCOSE SERPL-MCNC: 105 MG/DL (ref 65–99)
HCT VFR BLD AUTO: 34.3 % (ref 42–52)
HGB BLD-MCNC: 10.7 G/DL (ref 14–18)
LDH SERPL L TO P-CCNC: 132 U/L (ref 107–266)
LYMPHOCYTES # BLD AUTO: 0.56 K/UL (ref 1–4.8)
LYMPHOCYTES NFR BLD: 27.8 % (ref 22–41)
MACROCYTES BLD QL SMEAR: ABNORMAL
MANUAL DIFF BLD: NORMAL
MCH RBC QN AUTO: 34.2 PG (ref 27–33)
MCHC RBC AUTO-ENTMCNC: 31.2 G/DL (ref 33.7–35.3)
MCV RBC AUTO: 109.6 FL (ref 81.4–97.8)
MONOCYTES # BLD AUTO: 0.43 K/UL (ref 0–0.85)
MONOCYTES NFR BLD AUTO: 21.7 % (ref 0–13.4)
MORPHOLOGY BLD-IMP: NORMAL
NEUTROPHILS # BLD AUTO: 0.77 K/UL (ref 1.82–7.42)
NEUTROPHILS NFR BLD: 37.4 % (ref 44–72)
NEUTS BAND NFR BLD MANUAL: 0.9 % (ref 0–10)
NRBC # BLD AUTO: 0 K/UL
NRBC BLD-RTO: 0 /100 WBC
OVALOCYTES BLD QL SMEAR: NORMAL
PLATELET # BLD AUTO: 97 K/UL (ref 164–446)
PLATELET BLD QL SMEAR: NORMAL
PMV BLD AUTO: 9.5 FL (ref 9–12.9)
POIKILOCYTOSIS BLD QL SMEAR: NORMAL
POTASSIUM SERPL-SCNC: 6.1 MMOL/L (ref 3.6–5.5)
PROT SERPL-MCNC: 5.7 G/DL (ref 6–8.2)
RBC # BLD AUTO: 3.13 M/UL (ref 4.7–6.1)
RBC BLD AUTO: PRESENT
SODIUM SERPL-SCNC: 134 MMOL/L (ref 135–145)
SPHEROCYTES BLD QL SMEAR: NORMAL
WBC # BLD AUTO: 2 K/UL (ref 4.8–10.8)

## 2018-02-05 PROCEDURE — 82784 ASSAY IGA/IGD/IGG/IGM EACH: CPT

## 2018-02-05 PROCEDURE — 83615 LACTATE (LD) (LDH) ENZYME: CPT

## 2018-02-05 PROCEDURE — 85007 BL SMEAR W/DIFF WBC COUNT: CPT

## 2018-02-05 PROCEDURE — 85027 COMPLETE CBC AUTOMATED: CPT

## 2018-02-05 PROCEDURE — 80053 COMPREHEN METABOLIC PANEL: CPT

## 2018-02-05 PROCEDURE — 36415 COLL VENOUS BLD VENIPUNCTURE: CPT

## 2018-02-05 PROCEDURE — 85810 BLOOD VISCOSITY EXAMINATION: CPT

## 2018-02-05 PROCEDURE — 84160 ASSAY OF PROTEIN ANY SOURCE: CPT

## 2018-02-05 PROCEDURE — 84165 PROTEIN E-PHORESIS SERUM: CPT

## 2018-02-07 LAB
IGA SERPL-MCNC: 36 MG/DL (ref 68–408)
IGG SERPL-MCNC: 271 MG/DL (ref 768–1632)
IGM SERPL-MCNC: 400 MG/DL (ref 35–263)

## 2018-02-08 LAB
ALBUMIN SERPL-MCNC: 3.43 G/DL (ref 3.75–5.01)
ALPHA1 GLOB SERPL ELPH-MCNC: 0.52 G/DL (ref 0.19–0.46)
ALPHA2 GLOB SERPL ELPH-MCNC: 1.01 G/DL (ref 0.48–1.05)
B-GLOBULIN SERPL ELPH-MCNC: 0.61 G/DL (ref 0.48–1.1)
EER PROT ELECT SER Q1092: ABNORMAL
GAMMA GLOB SERPL ELPH-MCNC: 0.44 G/DL (ref 0.62–1.51)
INTERPRETATION SERPL IFE-IMP: ABNORMAL
PROT SERPL-MCNC: 6 G/DL (ref 6–8.3)

## 2018-02-09 LAB — VISC SER: 1.42 CP (ref 1.1–1.8)

## 2018-02-12 DIAGNOSIS — M54.40 ACUTE BILATERAL LOW BACK PAIN WITH SCIATICA, SCIATICA LATERALITY UNSPECIFIED: ICD-10-CM

## 2018-02-12 RX ORDER — METHOCARBAMOL 500 MG/1
500 TABLET, FILM COATED ORAL 3 TIMES DAILY PRN
Qty: 90 TAB | Refills: 3 | Status: SHIPPED | OUTPATIENT
Start: 2018-02-12

## 2018-02-12 RX ORDER — MELOXICAM 15 MG/1
15 TABLET ORAL DAILY
Qty: 30 TAB | Refills: 3 | Status: SHIPPED | OUTPATIENT
Start: 2018-02-12 | End: 2018-03-20

## 2018-02-27 DIAGNOSIS — R11.2 NAUSEA AND VOMITING, INTRACTABILITY OF VOMITING NOT SPECIFIED, UNSPECIFIED VOMITING TYPE: ICD-10-CM

## 2018-02-28 ENCOUNTER — HOSPITAL ENCOUNTER (OUTPATIENT)
Dept: LAB | Facility: MEDICAL CENTER | Age: 78
End: 2018-02-28
Attending: INTERNAL MEDICINE
Payer: MEDICARE

## 2018-02-28 LAB
ALBUMIN SERPL BCP-MCNC: 3.8 G/DL (ref 3.2–4.9)
ALBUMIN/GLOB SERPL: 1.5 G/DL
ALP SERPL-CCNC: 65 U/L (ref 30–99)
ALT SERPL-CCNC: 7 U/L (ref 2–50)
ANION GAP SERPL CALC-SCNC: 11 MMOL/L (ref 0–11.9)
AST SERPL-CCNC: 12 U/L (ref 12–45)
BASOPHILS # BLD AUTO: 1.9 % (ref 0–1.8)
BASOPHILS # BLD: 0.09 K/UL (ref 0–0.12)
BILIRUB SERPL-MCNC: 0.4 MG/DL (ref 0.1–1.5)
BUN SERPL-MCNC: 27 MG/DL (ref 8–22)
CALCIUM SERPL-MCNC: 11.4 MG/DL (ref 8.5–10.5)
CHLORIDE SERPL-SCNC: 97 MMOL/L (ref 96–112)
CO2 SERPL-SCNC: 28 MMOL/L (ref 20–33)
CORTIS SERPL-MCNC: 13.2 UG/DL (ref 0–23)
CREAT SERPL-MCNC: 8.7 MG/DL (ref 0.5–1.4)
EOSINOPHIL # BLD AUTO: 0.19 K/UL (ref 0–0.51)
EOSINOPHIL NFR BLD: 4 % (ref 0–6.9)
ERYTHROCYTE [DISTWIDTH] IN BLOOD BY AUTOMATED COUNT: 50.2 FL (ref 35.9–50)
GLOBULIN SER CALC-MCNC: 2.6 G/DL (ref 1.9–3.5)
GLUCOSE SERPL-MCNC: 94 MG/DL (ref 65–99)
HCT VFR BLD AUTO: 38.7 % (ref 42–52)
HGB BLD-MCNC: 12 G/DL (ref 14–18)
IMM GRANULOCYTES # BLD AUTO: 0.02 K/UL (ref 0–0.11)
IMM GRANULOCYTES NFR BLD AUTO: 0.4 % (ref 0–0.9)
LDH SERPL L TO P-CCNC: 199 U/L (ref 107–266)
LYMPHOCYTES # BLD AUTO: 0.53 K/UL (ref 1–4.8)
LYMPHOCYTES NFR BLD: 11.2 % (ref 22–41)
MCH RBC QN AUTO: 32.9 PG (ref 27–33)
MCHC RBC AUTO-ENTMCNC: 31 G/DL (ref 33.7–35.3)
MCV RBC AUTO: 106 FL (ref 81.4–97.8)
MONOCYTES # BLD AUTO: 0.37 K/UL (ref 0–0.85)
MONOCYTES NFR BLD AUTO: 7.8 % (ref 0–13.4)
NEUTROPHILS # BLD AUTO: 3.53 K/UL (ref 1.82–7.42)
NEUTROPHILS NFR BLD: 74.7 % (ref 44–72)
NRBC # BLD AUTO: 0 K/UL
NRBC BLD-RTO: 0 /100 WBC
PLATELET # BLD AUTO: 118 K/UL (ref 164–446)
PMV BLD AUTO: 9.6 FL (ref 9–12.9)
POTASSIUM SERPL-SCNC: 4.9 MMOL/L (ref 3.6–5.5)
PROT SERPL-MCNC: 6.4 G/DL (ref 6–8.2)
RBC # BLD AUTO: 3.65 M/UL (ref 4.7–6.1)
SODIUM SERPL-SCNC: 136 MMOL/L (ref 135–145)
WBC # BLD AUTO: 4.7 K/UL (ref 4.8–10.8)

## 2018-02-28 PROCEDURE — 82784 ASSAY IGA/IGD/IGG/IGM EACH: CPT

## 2018-02-28 PROCEDURE — 36415 COLL VENOUS BLD VENIPUNCTURE: CPT

## 2018-02-28 PROCEDURE — 86694 HERPES SIMPLEX NES ANTBDY: CPT

## 2018-02-28 PROCEDURE — 84165 PROTEIN E-PHORESIS SERUM: CPT

## 2018-02-28 PROCEDURE — 82533 TOTAL CORTISOL: CPT

## 2018-02-28 PROCEDURE — 83615 LACTATE (LD) (LDH) ENZYME: CPT

## 2018-02-28 PROCEDURE — 84160 ASSAY OF PROTEIN ANY SOURCE: CPT

## 2018-02-28 PROCEDURE — 85025 COMPLETE CBC W/AUTO DIFF WBC: CPT

## 2018-02-28 PROCEDURE — 85810 BLOOD VISCOSITY EXAMINATION: CPT

## 2018-02-28 PROCEDURE — 80053 COMPREHEN METABOLIC PANEL: CPT

## 2018-03-01 LAB
HSV1+2 IGG SER IA-ACNC: 0.22 IV
IGA SERPL-MCNC: 39 MG/DL (ref 68–408)
IGG SERPL-MCNC: 343 MG/DL (ref 768–1632)
IGM SERPL-MCNC: 463 MG/DL (ref 35–263)

## 2018-03-01 RX ORDER — ONDANSETRON 4 MG/1
4 TABLET, FILM COATED ORAL EVERY 4 HOURS PRN
Qty: 10 TAB | Refills: 0 | Status: SHIPPED | OUTPATIENT
Start: 2018-03-01 | End: 2018-03-20

## 2018-03-02 LAB
ALBUMIN SERPL-MCNC: 3.78 G/DL (ref 3.75–5.01)
ALPHA1 GLOB SERPL ELPH-MCNC: 0.45 G/DL (ref 0.19–0.46)
ALPHA2 GLOB SERPL ELPH-MCNC: 0.94 G/DL (ref 0.48–1.05)
B-GLOBULIN SERPL ELPH-MCNC: 0.8 G/DL (ref 0.48–1.1)
EER PROT ELECT SER Q1092: ABNORMAL
GAMMA GLOB SERPL ELPH-MCNC: 0.42 G/DL (ref 0.62–1.51)
INTERPRETATION SERPL IFE-IMP: ABNORMAL
PROT SERPL-MCNC: 6.4 G/DL (ref 6–8.3)

## 2018-03-03 LAB — VISC SER: 1.43 CP (ref 1.1–1.8)

## 2018-03-13 RX ORDER — OMEPRAZOLE 20 MG/1
20 CAPSULE, DELAYED RELEASE ORAL DAILY
Qty: 90 CAP | Refills: 3 | Status: SHIPPED | OUTPATIENT
Start: 2018-03-13

## 2018-03-20 ENCOUNTER — OFFICE VISIT (OUTPATIENT)
Dept: INTERNAL MEDICINE | Facility: IMAGING CENTER | Age: 78
End: 2018-03-20
Payer: MEDICARE

## 2018-03-20 VITALS
TEMPERATURE: 97.9 F | BODY MASS INDEX: 22.54 KG/M2 | HEART RATE: 78 BPM | OXYGEN SATURATION: 94 % | HEIGHT: 71 IN | RESPIRATION RATE: 14 BRPM | DIASTOLIC BLOOD PRESSURE: 88 MMHG | WEIGHT: 161 LBS | SYSTOLIC BLOOD PRESSURE: 130 MMHG

## 2018-03-20 DIAGNOSIS — R62.7 ADULT FAILURE TO THRIVE: ICD-10-CM

## 2018-03-20 DIAGNOSIS — N18.6 END STAGE RENAL DISEASE ON DIALYSIS (HCC): Chronic | ICD-10-CM

## 2018-03-20 DIAGNOSIS — Z99.2 END STAGE RENAL DISEASE ON DIALYSIS (HCC): Chronic | ICD-10-CM

## 2018-03-20 DIAGNOSIS — R10.84 GENERALIZED ABDOMINAL PAIN: ICD-10-CM

## 2018-03-20 DIAGNOSIS — Z86.718 HISTORY OF DVT (DEEP VEIN THROMBOSIS): ICD-10-CM

## 2018-03-20 DIAGNOSIS — M48.56XS COMPRESSION FRACTURE OF LUMBAR VERTEBRA, NON-TRAUMATIC, SEQUELA: Chronic | ICD-10-CM

## 2018-03-20 DIAGNOSIS — M51.36 DDD (DEGENERATIVE DISC DISEASE), LUMBAR: Chronic | ICD-10-CM

## 2018-03-20 DIAGNOSIS — M54.10 BACK PAIN WITH LEFT-SIDED RADICULOPATHY: Chronic | ICD-10-CM

## 2018-03-20 DIAGNOSIS — R63.0 DECREASED APPETITE: ICD-10-CM

## 2018-03-20 PROCEDURE — 99214 OFFICE O/P EST MOD 30 MIN: CPT | Performed by: FAMILY MEDICINE

## 2018-03-21 RX ORDER — TOBRAMYCIN AND DEXAMETHASONE 3; 1 MG/ML; MG/ML
1 SUSPENSION/ DROPS OPHTHALMIC
COMMUNITY

## 2018-03-21 RX ORDER — HYDROCORTISONE 10 MG/1
10 TABLET ORAL
COMMUNITY

## 2018-03-21 RX ORDER — PREDNISOLONE ACETATE 10 MG/ML
1 SUSPENSION/ DROPS OPHTHALMIC EVERY 4 HOURS
COMMUNITY

## 2018-03-21 NOTE — PROGRESS NOTES
"Chief Complaint   Patient presents with   • Back Pain   • RUQ Pain       HPI:  Patient is a 77 y.o. male established patient who presents today with his daughter to discuss ongoing low back pain with L radicular symptoms not improved with epidural injection on March 8, 2018. He has trial Tramadol in the past with adverse acute encephalopathy requiring hospitalization and has self discontinued Meloxicam and Neurontin recently due to lack of effectiveness. He is extremely sensitive to all medications, especially in light of the fact that he has ESRD requiring HD M/W/F. He reports lack of appetite due to discomfort and continues to use a walker for safe mobility. He continues to use 2-3 L NC O2 for CHRF/O2 dependent COPD without change and also is concerned about status of previous bilateral  UE DVTs. He has not had imaging on his arms in numerous months and continues to take Eliquis BID without known complications. He is also complaining of RUQ pain today but is very vague about aggravating and relieving factors. He reports ongoing issues with hard stools and continues to produce small amounts of clear urine daily.  I asked him very openly if he felt he was dwindling overall today but he denied feeling that way and noted that he \"has problems that can be fixed.\" He continues to see Dr. Resendez for treatment of panuveitis of his right eye. He has kindly brought in updated medication list for review today.     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   • Chronic anticoagulation 01/09/2018   • Secondary hypercoagulable state (CMS-HCC) 01/09/2018   • Macrocytic anemia 01/07/2018   • Pancytopenia " "(CMS-HCC) 01/07/2018   • Oxygen dependent 01/07/2018   • Waldenstrom macroglobulinemia (CMS-HCC) 01/07/2018   • End stage renal disease on dialysis (CMS-HCC) 01/07/2018   • COPD (chronic obstructive pulmonary disease) (CMS-HCC) 01/07/2018   • History of DVT (deep vein thrombosis) 01/07/2018   • Recurrent pneumonia 01/07/2018   • Steroid dependent for adrenal supression 01/07/2018   • Renal osteodystrophy 01/07/2018   • H/O aspergillosis 01/07/2018   • History of tobacco abuse 01/07/2018     Past medical, surgical, family, and social history was reviewed in Epic chart by me today.     Medications and allergies reviewed and updated in Epic chart by me today.     ROS:  Pertinent positives listed above in HPI. All other systems have been reviewed and are negative.    PE:   /88   Pulse 78   Temp 36.6 °C (97.9 °F)   Resp 14   Ht 1.803 m (5' 10.98\")   Wt 73 kg (161 lb)   SpO2 94%   BMI 22.47 kg/m²   Vital signs reviewed with patient.     Gen: Well developed; well nourished; no acute distress; pale and chronically ill in appearance   CV: Regular rate and rhythm; S1/ S2 present; no murmur, gallop or rub noted  Pulm: No respiratory distress; NC O2 in place; diminished BS b/l; no wheezing or stridor noted b/l  Abd: Adequate bowel sounds noted; soft and nontender; no rebound, rigidity, nor distention; mild RUQ soreness with palpation    Extremities: No peripheral edema b/l LE extremities/ no clubbing nor cyanosis noted  Skin: Warm and dry; no rashes noted   Neuro: No new focal deficits noted; walks with walker   Psych: AAOx4; mood and affect are appropriate    A/P:  1. History of DVT (deep vein thrombosis) (b/l UE)  Pt has not had imaging done in numerous months and is concerned about status of his clots/ will check b/l UE US - pt prefers to do imaging at [place] and orders will be faxed to their imaging department for his convenience.   - US-EXTREMITY VENOUS BILATERAL UPPER; Future    2. Generalized abdominal " pain  Due to patient being vague historian regarding this matter, will start with abdominal US to evaluate this condition. I also discussed various stool softeners for him to take BID and recommend he increase his water intake by 8-10 oz daily.   - US-ABDOMEN COMPLETE SURVEY; Future    3. Back pain with left-sided radiculopathy  Ongoing issue not helped with recent epidural injection - recommend patient f/u with NSG team regarding other modalities of treatment. Als recommended trial of OTC lidoderm patches for pain relief. Pt brought up topic of THC for another form of pain relief and considering his poor appetite, pain issues, and failure to thrive issues, this may be an excellent modality for him. Recommend that he has someone with him the first time he uses edibles or tinctures in case of adverse reaction. I am not inclined to give him narcotics to due prior poor reaction to low dose Ultram but would certainly like to help him in any way possible for increased comfort.     4. Decreased appetite  Multi factorial - see above    5. Adult failure to thrive  Ongoing issue although the patient does not acknowledge this as being a concern.     6. End stage renal disease on dialysis (CMS-HCC)  Stable/ pt to continue M/W/F HD managed by [facility].     7. DDD (degenerative disc disease), lumbar  See above/ being managed by NSG team with non surgical options.      I will be in touch with patient when imaging results are available for further care management.

## 2018-03-22 DIAGNOSIS — E43 SEVERE PROTEIN-CALORIE MALNUTRITION (HCC): ICD-10-CM

## 2018-03-22 RX ORDER — CYPROHEPTADINE HYDROCHLORIDE 2 MG/5ML
2 SOLUTION ORAL 2 TIMES DAILY
Qty: 140 ML | Refills: 0 | Status: SHIPPED | OUTPATIENT
Start: 2018-03-22

## 2018-03-22 RX ORDER — ONDANSETRON 4 MG/1
4 TABLET, FILM COATED ORAL EVERY 4 HOURS PRN
Qty: 10 TAB | Refills: 2 | Status: SHIPPED | OUTPATIENT
Start: 2018-03-22

## 2018-04-27 DIAGNOSIS — M51.36 DDD (DEGENERATIVE DISC DISEASE), LUMBAR: Chronic | ICD-10-CM

## 2018-04-27 DIAGNOSIS — M41.25 OTHER IDIOPATHIC SCOLIOSIS, THORACOLUMBAR REGION: Chronic | ICD-10-CM

## 2018-04-27 DIAGNOSIS — M48.56XS COMPRESSION FRACTURE OF LUMBAR VERTEBRA, NON-TRAUMATIC, SEQUELA: Chronic | ICD-10-CM

## 2018-04-27 DIAGNOSIS — R53.81 PHYSICAL DEBILITY: Chronic | ICD-10-CM

## 2018-04-27 DIAGNOSIS — M54.10 BACK PAIN WITH LEFT-SIDED RADICULOPATHY: Chronic | ICD-10-CM

## 2018-05-07 RX ORDER — ONDANSETRON 8 MG/1
8 TABLET, ORALLY DISINTEGRATING ORAL EVERY 8 HOURS PRN
Qty: 15 TAB | Refills: 0 | Status: SHIPPED | OUTPATIENT
Start: 2018-05-07

## 2018-05-10 ENCOUNTER — TELEPHONE (OUTPATIENT)
Dept: INTERNAL MEDICINE | Facility: IMAGING CENTER | Age: 78
End: 2018-05-10

## 2018-05-10 DIAGNOSIS — M48.56XS COMPRESSION FRACTURE OF LUMBAR VERTEBRA, NON-TRAUMATIC, SEQUELA: Chronic | ICD-10-CM

## 2018-05-10 DIAGNOSIS — M51.36 DDD (DEGENERATIVE DISC DISEASE), LUMBAR: Chronic | ICD-10-CM

## 2018-05-10 DIAGNOSIS — M54.10 BACK PAIN WITH LEFT-SIDED RADICULOPATHY: Chronic | ICD-10-CM

## 2018-05-10 RX ORDER — OXYCODONE HYDROCHLORIDE 5 MG/1
5 TABLET ORAL EVERY 8 HOURS PRN
Qty: 20 TAB | Refills: 0 | Status: SHIPPED | OUTPATIENT
Start: 2018-05-10 | End: 2018-05-17

## 2018-05-10 NOTE — TELEPHONE ENCOUNTER
Radha called and spoke to Amina STAFFORD initially about Juanito's continual health decline since April. He is now planning to fly back to his home in [place] with assistance from his son. Juanito is aware that he is slowly dying and prefers to be in [place] when this event occurs. He has battled numerous health problems within the last 12-18 months including debilitating back pain. Radha is requesting small amount of oxycodone for pain control while Juanito transitions back to [place] - which is more than appropriate considering circumstances. I called Radha to provide support and to coordinate  of RX for her father. All questions answered.  reviewed today and no concerns noted. Pt/family are well versed in safe use of controlled medication, and benefit of use far outweighs risk at this time.

## (undated) DEVICE — SUTURE EYE

## (undated) DEVICE — SUTURE 7-0 VICRYL TG140-8 (12PK/BX)

## (undated) DEVICE — ELECTRODE 850 FOAM ADHESIVE - HYDROGEL RADIOTRNSPRNT (50/PK)

## (undated) DEVICE — PAD EYE GAUZE COVERED OVAL 1 5/8 X 2 5/8" STERILE"

## (undated) DEVICE — PROTECTOR ULNA NERVE - (36PR/CA)

## (undated) DEVICE — KIT ANESTHESIA W/CIRCUIT & 3/LT BAG W/FILTER (20EA/CA)

## (undated) DEVICE — SET LEADWIRE 5 LEAD BEDSIDE DISPOSABLE ECG (1SET OF 5/EA)

## (undated) DEVICE — MASK ANESTHESIA ADULT  - (100/CA)

## (undated) DEVICE — TUBING CLEARLINK DUO-VENT - C-FLO (48EA/CA)

## (undated) DEVICE — LACTATED RINGERS INJ 1000 ML - (14EA/CA 60CA/PF)

## (undated) DEVICE — NEPTUNE 4 PORT MANIFOLD - (20/PK)

## (undated) DEVICE — KIT  I.V. START (100EA/CA)

## (undated) DEVICE — WATER IRRIGATION STERILE 1000ML (12EA/CA)

## (undated) DEVICE — CANISTER SUCTION 3000ML MECHANICAL FILTER AUTO SHUTOFF MEDI-VAC NONSTERILE LF DISP  (40EA/CA)

## (undated) DEVICE — SODIUM CHL IRRIGATION 0.9% 1000ML (12EA/CA)

## (undated) DEVICE — SLEEVE, VASO, THIGH, MED

## (undated) DEVICE — SYRINGE SAFETY 10 ML 18 GA X 1 1/2 BLUNT LL (100/BX 4BX/CA)

## (undated) DEVICE — SENSOR SPO2 NEO LNCS ADHESIVE (20/BX) SEE USER NOTES

## (undated) DEVICE — GLOVE BIOGEL SURGICAL PF LATEX M SIZE 8.5 (50PR/BX 4BX/CA)

## (undated) DEVICE — GOWN WARMING STANDARD FLEX - (30/CA)

## (undated) DEVICE — CANISTER SUCTION RIGID RED 1500CC (40EA/CA)

## (undated) DEVICE — NEEDLE FILTER ASPIRATION 18 GA X 1 1/2 IN (100EA/BX)

## (undated) DEVICE — DRESSING TRANSPARENT FILM TEGADERM 2.375 X 2.75"  (100EA/BX)"

## (undated) DEVICE — SHIELD OPTH AL GRTR CVR FOX (50EA/BX)

## (undated) DEVICE — CATHETER IV 20 GA X 1-1/4 ---SURG.& SDS ONLY--- (50EA/BX)

## (undated) DEVICE — SUCTION INSTRUMENT YANKAUER BULBOUS TIP W/O VENT (50EA/CA)